# Patient Record
Sex: MALE | Race: WHITE | NOT HISPANIC OR LATINO | URBAN - METROPOLITAN AREA
[De-identification: names, ages, dates, MRNs, and addresses within clinical notes are randomized per-mention and may not be internally consistent; named-entity substitution may affect disease eponyms.]

---

## 2017-09-02 ENCOUNTER — EMERGENCY (EMERGENCY)
Facility: HOSPITAL | Age: 62
LOS: 1 days | Discharge: HOME | End: 2017-09-02
Attending: EMERGENCY MEDICINE | Admitting: EMERGENCY MEDICINE
Payer: MEDICARE

## 2017-09-02 VITALS
WEIGHT: 233.91 LBS | HEART RATE: 77 BPM | SYSTOLIC BLOOD PRESSURE: 168 MMHG | TEMPERATURE: 98 F | OXYGEN SATURATION: 98 % | DIASTOLIC BLOOD PRESSURE: 84 MMHG | RESPIRATION RATE: 20 BRPM

## 2017-09-02 DIAGNOSIS — Z98.89 OTHER SPECIFIED POSTPROCEDURAL STATES: Chronic | ICD-10-CM

## 2017-09-02 PROCEDURE — 41800 DRAINAGE OF GUM LESION: CPT

## 2017-09-02 PROCEDURE — 99284 EMERGENCY DEPT VISIT MOD MDM: CPT | Mod: 25

## 2017-09-02 PROCEDURE — 99284 EMERGENCY DEPT VISIT MOD MDM: CPT

## 2017-09-02 RX ORDER — AMOXICILLIN 250 MG/5ML
500 SUSPENSION, RECONSTITUTED, ORAL (ML) ORAL ONCE
Qty: 0 | Refills: 0 | Status: COMPLETED | OUTPATIENT
Start: 2017-09-02 | End: 2017-09-02

## 2017-09-02 RX ORDER — IBUPROFEN 200 MG
600 TABLET ORAL ONCE
Qty: 0 | Refills: 0 | Status: COMPLETED | OUTPATIENT
Start: 2017-09-02 | End: 2017-09-02

## 2017-09-02 RX ORDER — OXYCODONE HYDROCHLORIDE 5 MG/1
1 TABLET ORAL
Qty: 8 | Refills: 0
Start: 2017-09-02 | End: 2017-09-04

## 2017-09-02 RX ORDER — OXYCODONE HYDROCHLORIDE 5 MG/1
5 TABLET ORAL ONCE
Qty: 0 | Refills: 0 | Status: DISCONTINUED | OUTPATIENT
Start: 2017-09-02 | End: 2017-09-02

## 2017-09-02 RX ORDER — AMOXICILLIN 250 MG/5ML
1 SUSPENSION, RECONSTITUTED, ORAL (ML) ORAL
Qty: 21 | Refills: 0
Start: 2017-09-02 | End: 2017-09-09

## 2017-09-02 RX ADMIN — Medication 500 MILLIGRAM(S): at 19:14

## 2017-09-02 RX ADMIN — Medication 600 MILLIGRAM(S): at 17:43

## 2017-09-02 RX ADMIN — OXYCODONE HYDROCHLORIDE 5 MILLIGRAM(S): 5 TABLET ORAL at 17:43

## 2017-09-02 NOTE — ED PROVIDER NOTE - OBJECTIVE STATEMENT
Patient presenting c/o pain in mouth with swelling.  Began earlier today.  Smoker, known poor dentition, "I think a tooth is cracked".  No fevers or chills, able to swallow, no problems breathing.
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
## 2017-09-02 NOTE — ED ADULT NURSE NOTE - OBJECTIVE STATEMENT
Pt AXOX4 gross neuro intact.  PT calm, cooperative.  PT c/o dental pain and left sided mouth swelling.  Pt states  pain is severe.  Awaiting dental consult.  pt medicated for pain.  NO F/C.

## 2017-09-02 NOTE — ED PROVIDER NOTE - ATTENDING CONTRIBUTION TO CARE
Patient seen and evaluated with resident/NP/PA, however HPI, ROS, PE and MDM as documented authored by myself - Dylan Collins MD

## 2017-10-13 ENCOUNTER — EMERGENCY (EMERGENCY)
Facility: HOSPITAL | Age: 62
LOS: 1 days | Discharge: HOME | End: 2017-10-13
Attending: EMERGENCY MEDICINE | Admitting: EMERGENCY MEDICINE
Payer: MEDICARE

## 2017-10-13 VITALS
TEMPERATURE: 99 F | DIASTOLIC BLOOD PRESSURE: 70 MMHG | SYSTOLIC BLOOD PRESSURE: 144 MMHG | OXYGEN SATURATION: 99 % | RESPIRATION RATE: 16 BRPM | HEART RATE: 78 BPM

## 2017-10-13 DIAGNOSIS — Z98.89 OTHER SPECIFIED POSTPROCEDURAL STATES: Chronic | ICD-10-CM

## 2017-10-13 LAB
BASE EXCESS BLDV CALC-SCNC: 5.1 MMOL/L — HIGH (ref -2–2)
BASOPHILS # BLD AUTO: 0.1 K/UL — SIGNIFICANT CHANGE UP (ref 0–0.2)
BASOPHILS NFR BLD AUTO: 0.9 % — SIGNIFICANT CHANGE UP (ref 0–2)
CA-I SERPL-SCNC: 1.16 MMOL/L — SIGNIFICANT CHANGE UP (ref 1.12–1.3)
CHLORIDE BLDV-SCNC: 104 MMOL/L — SIGNIFICANT CHANGE UP (ref 96–108)
CO2 BLDV-SCNC: 32 MMOL/L — HIGH (ref 22–30)
EOSINOPHIL # BLD AUTO: 0.4 K/UL — SIGNIFICANT CHANGE UP (ref 0–0.5)
EOSINOPHIL NFR BLD AUTO: 3.8 % — SIGNIFICANT CHANGE UP (ref 0–6)
GAS PNL BLDV: 139 MMOL/L — SIGNIFICANT CHANGE UP (ref 136–145)
GAS PNL BLDV: SIGNIFICANT CHANGE UP
GAS PNL BLDV: SIGNIFICANT CHANGE UP
GLUCOSE BLDV-MCNC: 99 MG/DL — SIGNIFICANT CHANGE UP (ref 70–99)
HCO3 BLDV-SCNC: 31 MMOL/L — HIGH (ref 21–29)
HCT VFR BLD CALC: 32.7 % — LOW (ref 39–50)
HCT VFR BLDA CALC: 44 % — SIGNIFICANT CHANGE UP (ref 39–50)
HGB BLD CALC-MCNC: 14.4 G/DL — SIGNIFICANT CHANGE UP (ref 13–17)
HGB BLD-MCNC: 11.6 G/DL — LOW (ref 13–17)
LACTATE BLDV-MCNC: 1.4 MMOL/L — SIGNIFICANT CHANGE UP (ref 0.7–2)
LYMPHOCYTES # BLD AUTO: 3.2 K/UL — SIGNIFICANT CHANGE UP (ref 1–3.3)
LYMPHOCYTES # BLD AUTO: 33.5 % — SIGNIFICANT CHANGE UP (ref 13–44)
MCHC RBC-ENTMCNC: 34 PG — SIGNIFICANT CHANGE UP (ref 27–34)
MCHC RBC-ENTMCNC: 35.5 GM/DL — SIGNIFICANT CHANGE UP (ref 32–36)
MCV RBC AUTO: 95.8 FL — SIGNIFICANT CHANGE UP (ref 80–100)
MONOCYTES # BLD AUTO: 1.4 K/UL — HIGH (ref 0–0.9)
MONOCYTES NFR BLD AUTO: 14 % — SIGNIFICANT CHANGE UP (ref 2–14)
NEUTROPHILS # BLD AUTO: 4.6 K/UL — SIGNIFICANT CHANGE UP (ref 1.8–7.4)
NEUTROPHILS NFR BLD AUTO: 47.9 % — SIGNIFICANT CHANGE UP (ref 43–77)
PCO2 BLDV: 52 MMHG — HIGH (ref 35–50)
PH BLDV: 7.39 — SIGNIFICANT CHANGE UP (ref 7.35–7.45)
PLATELET # BLD AUTO: 293 K/UL — SIGNIFICANT CHANGE UP (ref 150–400)
PO2 BLDV: 28 MMHG — SIGNIFICANT CHANGE UP (ref 25–45)
POTASSIUM BLDV-SCNC: 3.7 MMOL/L — SIGNIFICANT CHANGE UP (ref 3.5–5)
RBC # BLD: 3.42 M/UL — LOW (ref 4.2–5.8)
RBC # FLD: 13 % — SIGNIFICANT CHANGE UP (ref 10.3–14.5)
SAO2 % BLDV: 51 % — LOW (ref 67–88)
WBC # BLD: 9.6 K/UL — SIGNIFICANT CHANGE UP (ref 3.8–10.5)
WBC # FLD AUTO: 9.6 K/UL — SIGNIFICANT CHANGE UP (ref 3.8–10.5)

## 2017-10-13 PROCEDURE — 82803 BLOOD GASES ANY COMBINATION: CPT

## 2017-10-13 PROCEDURE — 82947 ASSAY GLUCOSE BLOOD QUANT: CPT

## 2017-10-13 PROCEDURE — 82435 ASSAY OF BLOOD CHLORIDE: CPT

## 2017-10-13 PROCEDURE — 93005 ELECTROCARDIOGRAM TRACING: CPT

## 2017-10-13 PROCEDURE — 99284 EMERGENCY DEPT VISIT MOD MDM: CPT | Mod: 25,GC

## 2017-10-13 PROCEDURE — 84132 ASSAY OF SERUM POTASSIUM: CPT

## 2017-10-13 PROCEDURE — 83605 ASSAY OF LACTIC ACID: CPT

## 2017-10-13 PROCEDURE — 82565 ASSAY OF CREATININE: CPT

## 2017-10-13 PROCEDURE — 85027 COMPLETE CBC AUTOMATED: CPT

## 2017-10-13 PROCEDURE — 84295 ASSAY OF SERUM SODIUM: CPT

## 2017-10-13 PROCEDURE — 71045 X-RAY EXAM CHEST 1 VIEW: CPT

## 2017-10-13 PROCEDURE — 99283 EMERGENCY DEPT VISIT LOW MDM: CPT | Mod: 25

## 2017-10-13 PROCEDURE — 93010 ELECTROCARDIOGRAM REPORT: CPT

## 2017-10-13 PROCEDURE — 85014 HEMATOCRIT: CPT

## 2017-10-13 PROCEDURE — 82330 ASSAY OF CALCIUM: CPT

## 2017-10-13 PROCEDURE — 71010: CPT | Mod: 26

## 2017-10-13 PROCEDURE — 80053 COMPREHEN METABOLIC PANEL: CPT

## 2017-10-13 PROCEDURE — 83880 ASSAY OF NATRIURETIC PEPTIDE: CPT

## 2017-10-13 NOTE — ED ADULT NURSE NOTE - OBJECTIVE STATEMENT
60 y/o M pt with PMHx of COPD, parkinson's, PSHx of shoulder replacement received ambulatory AXOX3 c/o bilateral ankles swelling. Pt stated that  he was on his feet a lot the past two days.  patient denies CP, SOB, fever, chills, nausea, vomiting, diarrhea. denies any recent travels. respiration  even unlabored, lung field clear bilaterally. plan of care explained to patient. will monitor for safety support and safety provided.

## 2017-10-13 NOTE — ED PROVIDER NOTE - PROGRESS NOTE DETAILS
Discussed lab work was normal with pt. Notes edema is just edema in bilateral legs. Advised pt to decrease smoking and elevate legs. Will Rx pt lasix x3 days. F/u with PMD.

## 2017-10-13 NOTE — ED PROVIDER NOTE - OBJECTIVE STATEMENT
62 y/o M pt with PMHx of COPD, parkinson's, PSHx of shoulder replacement c/o swelling of bilateral ankles. Pt states he was on his feet a lot the past two days. Wife brought pt to the ED today because his ankles were very pale and cold today after the shower. Denies CP, SOB, fever, hx of similar sx or any other complaints.
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
## 2017-10-13 NOTE — ED PROVIDER NOTE - MEDICAL DECISION MAKING DETAILS
62 y/o M pt presents to the ED for peripheral edema. Plan: r/o usual causes. Pt shows no signs of DVT.

## 2017-10-14 VITALS
HEART RATE: 75 BPM | TEMPERATURE: 99 F | OXYGEN SATURATION: 100 % | RESPIRATION RATE: 17 BRPM | SYSTOLIC BLOOD PRESSURE: 135 MMHG | DIASTOLIC BLOOD PRESSURE: 68 MMHG

## 2017-10-14 LAB
ALBUMIN SERPL ELPH-MCNC: 4 G/DL — SIGNIFICANT CHANGE UP (ref 3.3–5)
ALP SERPL-CCNC: 60 U/L — SIGNIFICANT CHANGE UP (ref 40–120)
ALT FLD-CCNC: 13 U/L RC — SIGNIFICANT CHANGE UP (ref 10–45)
ANION GAP SERPL CALC-SCNC: 13 MMOL/L — SIGNIFICANT CHANGE UP (ref 5–17)
AST SERPL-CCNC: 17 U/L — SIGNIFICANT CHANGE UP (ref 10–40)
BILIRUB SERPL-MCNC: 0.2 MG/DL — SIGNIFICANT CHANGE UP (ref 0.2–1.2)
BUN SERPL-MCNC: 15 MG/DL — SIGNIFICANT CHANGE UP (ref 7–23)
CALCIUM SERPL-MCNC: 8.9 MG/DL — SIGNIFICANT CHANGE UP (ref 8.4–10.5)
CHLORIDE SERPL-SCNC: 100 MMOL/L — SIGNIFICANT CHANGE UP (ref 96–108)
CO2 SERPL-SCNC: 29 MMOL/L — SIGNIFICANT CHANGE UP (ref 22–31)
CREAT SERPL-MCNC: 0.76 MG/DL — SIGNIFICANT CHANGE UP (ref 0.5–1.3)
GLUCOSE SERPL-MCNC: 103 MG/DL — HIGH (ref 70–99)
NT-PROBNP SERPL-SCNC: 123 PG/ML — SIGNIFICANT CHANGE UP (ref 0–300)
POTASSIUM SERPL-MCNC: 4.1 MMOL/L — SIGNIFICANT CHANGE UP (ref 3.5–5.3)
POTASSIUM SERPL-SCNC: 4.1 MMOL/L — SIGNIFICANT CHANGE UP (ref 3.5–5.3)
PROT SERPL-MCNC: 7.1 G/DL — SIGNIFICANT CHANGE UP (ref 6–8.3)
SODIUM SERPL-SCNC: 142 MMOL/L — SIGNIFICANT CHANGE UP (ref 135–145)

## 2017-10-14 RX ORDER — FUROSEMIDE 40 MG
1 TABLET ORAL
Qty: 5 | Refills: 0
Start: 2017-10-14 | End: 2017-10-19

## 2019-07-16 ENCOUNTER — EMERGENCY (EMERGENCY)
Facility: HOSPITAL | Age: 64
LOS: 1 days | Discharge: HOME | End: 2019-07-16
Attending: EMERGENCY MEDICINE
Payer: MEDICARE

## 2019-07-16 VITALS
TEMPERATURE: 98 F | WEIGHT: 210.1 LBS | HEIGHT: 72 IN | RESPIRATION RATE: 16 BRPM | OXYGEN SATURATION: 95 % | SYSTOLIC BLOOD PRESSURE: 133 MMHG | DIASTOLIC BLOOD PRESSURE: 81 MMHG | HEART RATE: 82 BPM

## 2019-07-16 DIAGNOSIS — Z98.89 OTHER SPECIFIED POSTPROCEDURAL STATES: Chronic | ICD-10-CM

## 2019-07-16 PROBLEM — G20 PARKINSON'S DISEASE: Chronic | Status: ACTIVE | Noted: 2017-09-02

## 2019-07-16 PROBLEM — J44.9 CHRONIC OBSTRUCTIVE PULMONARY DISEASE, UNSPECIFIED: Chronic | Status: ACTIVE | Noted: 2017-10-14

## 2019-07-16 PROCEDURE — 90471 IMMUNIZATION ADMIN: CPT

## 2019-07-16 PROCEDURE — 99284 EMERGENCY DEPT VISIT MOD MDM: CPT | Mod: 25

## 2019-07-16 PROCEDURE — 96372 THER/PROPH/DIAG INJ SC/IM: CPT

## 2019-07-16 PROCEDURE — 71250 CT THORAX DX C-: CPT

## 2019-07-16 PROCEDURE — 90715 TDAP VACCINE 7 YRS/> IM: CPT

## 2019-07-16 PROCEDURE — 99284 EMERGENCY DEPT VISIT MOD MDM: CPT | Mod: GC

## 2019-07-16 PROCEDURE — 71250 CT THORAX DX C-: CPT | Mod: 26

## 2019-07-16 RX ORDER — KETOROLAC TROMETHAMINE 30 MG/ML
30 SYRINGE (ML) INJECTION ONCE
Refills: 0 | Status: DISCONTINUED | OUTPATIENT
Start: 2019-07-16 | End: 2019-07-16

## 2019-07-16 RX ORDER — TETANUS TOXOID, REDUCED DIPHTHERIA TOXOID AND ACELLULAR PERTUSSIS VACCINE, ADSORBED 5; 2.5; 8; 8; 2.5 [IU]/.5ML; [IU]/.5ML; UG/.5ML; UG/.5ML; UG/.5ML
0.5 SUSPENSION INTRAMUSCULAR ONCE
Refills: 0 | Status: COMPLETED | OUTPATIENT
Start: 2019-07-16 | End: 2019-07-16

## 2019-07-16 RX ORDER — ACETAMINOPHEN 500 MG
650 TABLET ORAL ONCE
Refills: 0 | Status: COMPLETED | OUTPATIENT
Start: 2019-07-16 | End: 2019-07-16

## 2019-07-16 RX ADMIN — TETANUS TOXOID, REDUCED DIPHTHERIA TOXOID AND ACELLULAR PERTUSSIS VACCINE, ADSORBED 0.5 MILLILITER(S): 5; 2.5; 8; 8; 2.5 SUSPENSION INTRAMUSCULAR at 11:41

## 2019-07-16 RX ADMIN — Medication 30 MILLIGRAM(S): at 11:41

## 2019-07-16 RX ADMIN — Medication 650 MILLIGRAM(S): at 12:10

## 2019-07-16 RX ADMIN — Medication 30 MILLIGRAM(S): at 12:10

## 2019-07-16 RX ADMIN — Medication 650 MILLIGRAM(S): at 11:40

## 2019-07-16 NOTE — ED PROVIDER NOTE - NS ED ROS FT
Constitutional: no fevers, chills  HEENT: no visual changes, no sore throat, no rhinorrhea  CV: no cp  Resp: no sob  GI: no abd pain, n/v, diarrhea/constipation  : no dysuria, hematuria  MSK: +R rib pain  skin: no rashes  neuro: no HA, no confusion  psych: no SI/HI  heme: no LAD
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
## 2019-07-16 NOTE — ED PROVIDER NOTE - PHYSICAL EXAMINATION
Vitals: WNL  Gen: laying comfortably in NAD  Head: NCAT  ENT: sclerae white, anicterus, moist mucous membranes. No exudates.   CV: RRR. Audible S1 and S2. No murmurs, rubs, gallops, S3, nor S4, 2+ radial and DP pulses   Pulm: Clear to auscultation bilaterally. No wheezes, rales, or rhonchi  Abd: soft, normoactive BS x4, NTND, no rebound, no guarding, no rashes  Musculoskeletal:  lateral aspect of rib 7-10 ttp without crepitus or obvious deformity or ecchymosis  Skin: no lesions or scars noted  Neurologic: AAOx3  : no CVA tenderness  Psych: no SI/HI

## 2020-08-03 NOTE — ED PROVIDER NOTE - PROGRESS NOTE DETAILS
72 no rib fx no ptx on ct -- scerotic focus on unknown etiology on rib 8 - pt instructed to fu outpt with pcp - copy of ct results pain well controlled in ed

## 2021-05-02 ENCOUNTER — INPATIENT (INPATIENT)
Facility: HOSPITAL | Age: 66
LOS: 2 days | Discharge: HOME | DRG: 175 | End: 2021-05-05
Attending: INTERNAL MEDICINE | Admitting: INTERNAL MEDICINE
Payer: MEDICARE

## 2021-05-02 VITALS
RESPIRATION RATE: 22 BRPM | OXYGEN SATURATION: 91 % | HEIGHT: 72 IN | WEIGHT: 233.03 LBS | SYSTOLIC BLOOD PRESSURE: 122 MMHG | TEMPERATURE: 98 F | HEART RATE: 78 BPM | DIASTOLIC BLOOD PRESSURE: 65 MMHG

## 2021-05-02 DIAGNOSIS — F10.10 ALCOHOL ABUSE, UNCOMPLICATED: ICD-10-CM

## 2021-05-02 DIAGNOSIS — Z98.89 OTHER SPECIFIED POSTPROCEDURAL STATES: Chronic | ICD-10-CM

## 2021-05-02 DIAGNOSIS — G20 PARKINSON'S DISEASE: ICD-10-CM

## 2021-05-02 DIAGNOSIS — F11.20 OPIOID DEPENDENCE, UNCOMPLICATED: ICD-10-CM

## 2021-05-02 DIAGNOSIS — F14.10 COCAINE ABUSE, UNCOMPLICATED: ICD-10-CM

## 2021-05-02 DIAGNOSIS — R06.02 SHORTNESS OF BREATH: ICD-10-CM

## 2021-05-02 LAB
ALBUMIN SERPL ELPH-MCNC: 3.7 G/DL — SIGNIFICANT CHANGE UP (ref 3.3–5)
ALP SERPL-CCNC: 68 U/L — SIGNIFICANT CHANGE UP (ref 40–120)
ALT FLD-CCNC: 11 U/L — SIGNIFICANT CHANGE UP (ref 10–45)
ANION GAP SERPL CALC-SCNC: 14 MMOL/L — SIGNIFICANT CHANGE UP (ref 5–17)
APTT BLD: 22 SEC — LOW (ref 27.5–35.5)
AST SERPL-CCNC: 11 U/L — SIGNIFICANT CHANGE UP (ref 10–40)
BASOPHILS # BLD AUTO: 0.04 K/UL — SIGNIFICANT CHANGE UP (ref 0–0.2)
BASOPHILS NFR BLD AUTO: 0.6 % — SIGNIFICANT CHANGE UP (ref 0–2)
BILIRUB SERPL-MCNC: 0.3 MG/DL — SIGNIFICANT CHANGE UP (ref 0.2–1.2)
BUN SERPL-MCNC: 13 MG/DL — SIGNIFICANT CHANGE UP (ref 7–23)
CALCIUM SERPL-MCNC: 9.6 MG/DL — SIGNIFICANT CHANGE UP (ref 8.4–10.5)
CHLORIDE SERPL-SCNC: 100 MMOL/L — SIGNIFICANT CHANGE UP (ref 96–108)
CO2 SERPL-SCNC: 28 MMOL/L — SIGNIFICANT CHANGE UP (ref 22–31)
CREAT SERPL-MCNC: 0.61 MG/DL — SIGNIFICANT CHANGE UP (ref 0.5–1.3)
D DIMER BLD IA.RAPID-MCNC: 368 NG/ML DDU — HIGH
EOSINOPHIL # BLD AUTO: 0.22 K/UL — SIGNIFICANT CHANGE UP (ref 0–0.5)
EOSINOPHIL NFR BLD AUTO: 3.5 % — SIGNIFICANT CHANGE UP (ref 0–6)
GLUCOSE BLDC GLUCOMTR-MCNC: 211 MG/DL — HIGH (ref 70–99)
GLUCOSE BLDC GLUCOMTR-MCNC: 239 MG/DL — HIGH (ref 70–99)
GLUCOSE BLDC GLUCOMTR-MCNC: 247 MG/DL — HIGH (ref 70–99)
GLUCOSE SERPL-MCNC: 155 MG/DL — HIGH (ref 70–99)
HCT VFR BLD CALC: 31.4 % — LOW (ref 39–50)
HGB BLD-MCNC: 10 G/DL — LOW (ref 13–17)
IMM GRANULOCYTES NFR BLD AUTO: 0.8 % — SIGNIFICANT CHANGE UP (ref 0–1.5)
INR BLD: 1.01 RATIO — SIGNIFICANT CHANGE UP (ref 0.88–1.16)
LYMPHOCYTES # BLD AUTO: 2.4 K/UL — SIGNIFICANT CHANGE UP (ref 1–3.3)
LYMPHOCYTES # BLD AUTO: 37.9 % — SIGNIFICANT CHANGE UP (ref 13–44)
MCHC RBC-ENTMCNC: 30 PG — SIGNIFICANT CHANGE UP (ref 27–34)
MCHC RBC-ENTMCNC: 31.8 GM/DL — LOW (ref 32–36)
MCV RBC AUTO: 94.3 FL — SIGNIFICANT CHANGE UP (ref 80–100)
MONOCYTES # BLD AUTO: 0.93 K/UL — HIGH (ref 0–0.9)
MONOCYTES NFR BLD AUTO: 14.7 % — HIGH (ref 2–14)
NEUTROPHILS # BLD AUTO: 2.7 K/UL — SIGNIFICANT CHANGE UP (ref 1.8–7.4)
NEUTROPHILS NFR BLD AUTO: 42.5 % — LOW (ref 43–77)
NRBC # BLD: 0 /100 WBCS — SIGNIFICANT CHANGE UP (ref 0–0)
NT-PROBNP SERPL-SCNC: 1214 PG/ML — HIGH (ref 0–300)
PLATELET # BLD AUTO: 373 K/UL — SIGNIFICANT CHANGE UP (ref 150–400)
POTASSIUM SERPL-MCNC: 3.8 MMOL/L — SIGNIFICANT CHANGE UP (ref 3.5–5.3)
POTASSIUM SERPL-SCNC: 3.8 MMOL/L — SIGNIFICANT CHANGE UP (ref 3.5–5.3)
PROT SERPL-MCNC: 7.1 G/DL — SIGNIFICANT CHANGE UP (ref 6–8.3)
PROTHROM AB SERPL-ACNC: 12.1 SEC — SIGNIFICANT CHANGE UP (ref 10.6–13.6)
RBC # BLD: 3.33 M/UL — LOW (ref 4.2–5.8)
RBC # FLD: 15.5 % — HIGH (ref 10.3–14.5)
SARS-COV-2 RNA SPEC QL NAA+PROBE: DETECTED
SODIUM SERPL-SCNC: 142 MMOL/L — SIGNIFICANT CHANGE UP (ref 135–145)
TROPONIN T, HIGH SENSITIVITY RESULT: 35 NG/L — SIGNIFICANT CHANGE UP (ref 0–51)
TROPONIN T, HIGH SENSITIVITY RESULT: 42 NG/L — SIGNIFICANT CHANGE UP (ref 0–51)
TSH SERPL-MCNC: 7.89 UIU/ML — HIGH (ref 0.27–4.2)
WBC # BLD: 6.34 K/UL — SIGNIFICANT CHANGE UP (ref 3.8–10.5)
WBC # FLD AUTO: 6.34 K/UL — SIGNIFICANT CHANGE UP (ref 3.8–10.5)

## 2021-05-02 PROCEDURE — 99223 1ST HOSP IP/OBS HIGH 75: CPT | Mod: CS

## 2021-05-02 PROCEDURE — 71045 X-RAY EXAM CHEST 1 VIEW: CPT | Mod: 26

## 2021-05-02 PROCEDURE — 71275 CT ANGIOGRAPHY CHEST: CPT | Mod: 26,MA

## 2021-05-02 PROCEDURE — 93010 ELECTROCARDIOGRAM REPORT: CPT

## 2021-05-02 PROCEDURE — 99291 CRITICAL CARE FIRST HOUR: CPT | Mod: CS,GC

## 2021-05-02 PROCEDURE — 93970 EXTREMITY STUDY: CPT | Mod: 26

## 2021-05-02 PROCEDURE — 99222 1ST HOSP IP/OBS MODERATE 55: CPT

## 2021-05-02 RX ORDER — DIVALPROEX SODIUM 500 MG/1
250 TABLET, DELAYED RELEASE ORAL THREE TIMES A DAY
Refills: 0 | Status: DISCONTINUED | OUTPATIENT
Start: 2021-05-02 | End: 2021-05-05

## 2021-05-02 RX ORDER — LEVOTHYROXINE SODIUM 125 MCG
100 TABLET ORAL DAILY
Refills: 0 | Status: DISCONTINUED | OUTPATIENT
Start: 2021-05-02 | End: 2021-05-05

## 2021-05-02 RX ORDER — SIMVASTATIN 20 MG/1
40 TABLET, FILM COATED ORAL AT BEDTIME
Refills: 0 | Status: DISCONTINUED | OUTPATIENT
Start: 2021-05-02 | End: 2021-05-05

## 2021-05-02 RX ORDER — OLANZAPINE 15 MG/1
5 TABLET, FILM COATED ORAL
Refills: 0 | Status: DISCONTINUED | OUTPATIENT
Start: 2021-05-02 | End: 2021-05-05

## 2021-05-02 RX ORDER — INSULIN LISPRO 100/ML
VIAL (ML) SUBCUTANEOUS AT BEDTIME
Refills: 0 | Status: DISCONTINUED | OUTPATIENT
Start: 2021-05-02 | End: 2021-05-05

## 2021-05-02 RX ORDER — DEXTROSE 50 % IN WATER 50 %
25 SYRINGE (ML) INTRAVENOUS ONCE
Refills: 0 | Status: DISCONTINUED | OUTPATIENT
Start: 2021-05-02 | End: 2021-05-05

## 2021-05-02 RX ORDER — GABAPENTIN 400 MG/1
0 CAPSULE ORAL
Qty: 0 | Refills: 0 | DISCHARGE

## 2021-05-02 RX ORDER — LEVOTHYROXINE SODIUM 125 MCG
1 TABLET ORAL
Qty: 0 | Refills: 0 | DISCHARGE

## 2021-05-02 RX ORDER — GLUCAGON INJECTION, SOLUTION 0.5 MG/.1ML
1 INJECTION, SOLUTION SUBCUTANEOUS ONCE
Refills: 0 | Status: DISCONTINUED | OUTPATIENT
Start: 2021-05-02 | End: 2021-05-05

## 2021-05-02 RX ORDER — ENOXAPARIN SODIUM 100 MG/ML
100 INJECTION SUBCUTANEOUS
Refills: 0 | Status: DISCONTINUED | OUTPATIENT
Start: 2021-05-02 | End: 2021-05-05

## 2021-05-02 RX ORDER — MIRTAZAPINE 45 MG/1
7.5 TABLET, ORALLY DISINTEGRATING ORAL
Qty: 0 | Refills: 0 | DISCHARGE

## 2021-05-02 RX ORDER — OLANZAPINE 15 MG/1
1 TABLET, FILM COATED ORAL
Qty: 0 | Refills: 0 | DISCHARGE

## 2021-05-02 RX ORDER — ENOXAPARIN SODIUM 100 MG/ML
106 INJECTION SUBCUTANEOUS ONCE
Refills: 0 | Status: DISCONTINUED | OUTPATIENT
Start: 2021-05-02 | End: 2021-05-02

## 2021-05-02 RX ORDER — DEXTROSE 50 % IN WATER 50 %
12.5 SYRINGE (ML) INTRAVENOUS ONCE
Refills: 0 | Status: DISCONTINUED | OUTPATIENT
Start: 2021-05-02 | End: 2021-05-05

## 2021-05-02 RX ORDER — MIRTAZAPINE 45 MG/1
7.5 TABLET, ORALLY DISINTEGRATING ORAL AT BEDTIME
Refills: 0 | Status: DISCONTINUED | OUTPATIENT
Start: 2021-05-02 | End: 2021-05-05

## 2021-05-02 RX ORDER — SERTRALINE 25 MG/1
0 TABLET, FILM COATED ORAL
Qty: 0 | Refills: 0 | DISCHARGE

## 2021-05-02 RX ORDER — DEXTROSE 50 % IN WATER 50 %
15 SYRINGE (ML) INTRAVENOUS ONCE
Refills: 0 | Status: DISCONTINUED | OUTPATIENT
Start: 2021-05-02 | End: 2021-05-05

## 2021-05-02 RX ORDER — LANOLIN ALCOHOL/MO/W.PET/CERES
10 CREAM (GRAM) TOPICAL AT BEDTIME
Refills: 0 | Status: DISCONTINUED | OUTPATIENT
Start: 2021-05-02 | End: 2021-05-05

## 2021-05-02 RX ORDER — SODIUM CHLORIDE 9 MG/ML
1000 INJECTION, SOLUTION INTRAVENOUS
Refills: 0 | Status: DISCONTINUED | OUTPATIENT
Start: 2021-05-02 | End: 2021-05-05

## 2021-05-02 RX ORDER — THIAMINE MONONITRATE (VIT B1) 100 MG
1 TABLET ORAL
Qty: 0 | Refills: 0 | DISCHARGE

## 2021-05-02 RX ORDER — DIVALPROEX SODIUM 500 MG/1
1 TABLET, DELAYED RELEASE ORAL
Qty: 0 | Refills: 0 | DISCHARGE

## 2021-05-02 RX ORDER — LANOLIN ALCOHOL/MO/W.PET/CERES
1 CREAM (GRAM) TOPICAL
Qty: 0 | Refills: 0 | DISCHARGE

## 2021-05-02 RX ORDER — VENLAFAXINE HCL 75 MG
0 CAPSULE, EXT RELEASE 24 HR ORAL
Qty: 0 | Refills: 0 | DISCHARGE

## 2021-05-02 RX ORDER — SIMVASTATIN 20 MG/1
1 TABLET, FILM COATED ORAL
Qty: 0 | Refills: 0 | DISCHARGE

## 2021-05-02 RX ORDER — DIVALPROEX SODIUM 500 MG/1
0 TABLET, DELAYED RELEASE ORAL
Qty: 0 | Refills: 0 | DISCHARGE

## 2021-05-02 RX ORDER — SERTRALINE 25 MG/1
100 TABLET, FILM COATED ORAL DAILY
Refills: 0 | Status: DISCONTINUED | OUTPATIENT
Start: 2021-05-02 | End: 2021-05-05

## 2021-05-02 RX ORDER — INSULIN LISPRO 100/ML
VIAL (ML) SUBCUTANEOUS
Refills: 0 | Status: DISCONTINUED | OUTPATIENT
Start: 2021-05-02 | End: 2021-05-05

## 2021-05-02 RX ORDER — SERTRALINE 25 MG/1
1 TABLET, FILM COATED ORAL
Qty: 0 | Refills: 0 | DISCHARGE

## 2021-05-02 RX ORDER — MAGNESIUM SULFATE 500 MG/ML
2 VIAL (ML) INJECTION ONCE
Refills: 0 | Status: COMPLETED | OUTPATIENT
Start: 2021-05-02 | End: 2021-05-02

## 2021-05-02 RX ORDER — GABAPENTIN 400 MG/1
400 CAPSULE ORAL
Refills: 0 | Status: DISCONTINUED | OUTPATIENT
Start: 2021-05-02 | End: 2021-05-05

## 2021-05-02 RX ORDER — DEXAMETHASONE 0.5 MG/5ML
6 ELIXIR ORAL ONCE
Refills: 0 | Status: COMPLETED | OUTPATIENT
Start: 2021-05-02 | End: 2021-05-02

## 2021-05-02 RX ADMIN — ENOXAPARIN SODIUM 100 MILLIGRAM(S): 100 INJECTION SUBCUTANEOUS at 17:53

## 2021-05-02 RX ADMIN — Medication 2: at 13:20

## 2021-05-02 RX ADMIN — Medication 0: at 21:28

## 2021-05-02 RX ADMIN — MIRTAZAPINE 7.5 MILLIGRAM(S): 45 TABLET, ORALLY DISINTEGRATING ORAL at 21:26

## 2021-05-02 RX ADMIN — ENOXAPARIN SODIUM 100 MILLIGRAM(S): 100 INJECTION SUBCUTANEOUS at 09:15

## 2021-05-02 RX ADMIN — Medication 10 MILLIGRAM(S): at 21:26

## 2021-05-02 RX ADMIN — Medication 2: at 17:51

## 2021-05-02 RX ADMIN — OLANZAPINE 5 MILLIGRAM(S): 15 TABLET, FILM COATED ORAL at 17:53

## 2021-05-02 RX ADMIN — Medication 6 MILLIGRAM(S): at 06:49

## 2021-05-02 RX ADMIN — DIVALPROEX SODIUM 250 MILLIGRAM(S): 500 TABLET, DELAYED RELEASE ORAL at 21:26

## 2021-05-02 RX ADMIN — GABAPENTIN 400 MILLIGRAM(S): 400 CAPSULE ORAL at 17:52

## 2021-05-02 RX ADMIN — Medication 50 GRAM(S): at 06:19

## 2021-05-02 RX ADMIN — SIMVASTATIN 40 MILLIGRAM(S): 20 TABLET, FILM COATED ORAL at 21:26

## 2021-05-02 NOTE — BH CONSULTATION LIAISON ASSESSMENT NOTE - NSICDXBHSECONDARYDX_PSY_ALL_CORE
Cocaine abuse   F14.10  Opioid dependence   F11.20  AA (alcohol abuse)   F10.10  Parkinson's disease   G20

## 2021-05-02 NOTE — ED ADULT NURSE REASSESSMENT NOTE - NS ED NURSE REASSESS COMMENT FT1
Report received from MARIANELA Boyce.  Patient awake and alert reports breathing feels better than when he arrived with nasal cannula in place on 3L.  IV in right wrist #20 and left AC #20 is patent, flushing without issue and is free of redness, swelling or pain Patient aware CT pending and agreeable to plan.  Patient has call bell next to him and verbalized how to use call bell.  Bed in lowest position.

## 2021-05-02 NOTE — BH CONSULTATION LIAISON ASSESSMENT NOTE - CASE SUMMARY
Patient is a 66yo male, , domiciled with family, retired [place] ,  , PPH long history of cocaine and opioid use, multiple rehab admissions, no prior suicide attempt, no prior psych admission, with PMHx parkinson's, copd, dm, covid in past unsure of exact dates, also recently intubated and extubated early April (pt says not from covid) sent to rehab where he ama'd yest because he had a  to go to [facility] for shortness of breath. Psychiatry was consulted for medication management.     Currently, patient is superficially cooperative, minimizes the severity of his substance use, AOtyree however is a limited historian. He admits to cocaine use. Denies any intentional overdose, or prior suicide attempts. Denies any depression, anxiety, joe, or psychosis. Denies passive and active SIIP/HIIP. Confirms taking the following psychotropic medications: Zyprexa 5mg bid, Depakote 250mg tid, Neurontin 400mg bid, Remeron 7.5mg qhs, melatonin 10mg qhs, sertraline 100mg qd, Synthroid 100mcg. Pt denies any thoughts of wishing to harm self or others at this time and does not recall overdosing on medications prior to his last hospitalization.  Pt does not meet criteria for inpatient psychiatric hospitalization at this time. He may continue with his outpt psychiatric care after discharge.

## 2021-05-02 NOTE — BH CONSULTATION LIAISON ASSESSMENT NOTE - SUMMARY
Patient is a 64yo male, , domiciled with family, retired [place] ,  , PPH long history of cocaine and opioid use, multiple rehab admissions, no prior suicide attempt, no prior psych admission, with PMHx parkinson's, copd, dm, covid in past unsure of exact dates, also recently intubated and extubated early April (pt says not from covid) sent to rehab where he ama'd yest because he had a  to go to [facility] for shortness of breath. Psychiatry was consulted for medication management.     Currently, patient is superficially cooperative, minimizes the severity of his substance use, AOx3 however is a limited historian. He admits to cocaine use. Denies any intentional overdose, or prior suicide attempts. Denies any depression, anxiety, joe, or psychosis. Denies passive and active SIIP/HIIP. Confirms taking the following psychotropic medications: Zyprexa 5mg bid, Depakote 250mg tid, Neurontin 400mg bid, Remeron 7.5mg qhs, melatonin 10mg qhs, sertraline 100mg qd, Synthroid 100mcg. MSE notable for depressed, mood-incongruent affect, nonspontaneous speech with decreased production and concrete thought process, however Pt also has comorbid Parkinson's disease which may contribute to today's presentation.     Recommendations:  - can continue home psych medications - hold Zyprexa if QTc >500ms: Zyprexa 5mg bid, Depakote 250mg tid, Neurontin 400mg bid, Remeron 7.5mg qhs, melatonin 10mg qhs, sertraline 100mg qd, Synthroid 100mcg.  - consider PRN Zyprexa 2.5mg PO/IM q6hrs for agitation if qtc <500  ***CANNOT give IM Zyprexa within 4 hours of IV/IM Ativan due to increased risk of respiratory depression and death.****  - Monitor EKG for qtc, monitor ANC.   - Pt denies SI/HI, contracts for safety appropriately, can remain on routine obs  - Pt would benefit from referral to addiction services as well as referral for outpatient psychiatrist and therapy - can offer [facility] [phone]. He does not meet criteria for inpatient psychiatric admission at this time.

## 2021-05-02 NOTE — BH CONSULTATION LIAISON ASSESSMENT NOTE - NSBHCONSULTMEDAGITATION_PSY_A_CORE FT
- consider PRN Zyprexa 2.5mg PO/IM q6hrs for agitation if qtc <500  ***CANNOT give IM Zyprexa within 4 hours of IV/IM Ativan due to increased risk of respiratory depression and death.****  - Monitor EKG for qtc, monitor ANC.

## 2021-05-02 NOTE — ED ADULT NURSE REASSESSMENT NOTE - NS ED NURSE REASSESS COMMENT FT1
Called the admission team at [phone] to ask for a diet order. Patient is able to eat a regular diet with no restrictions as per admitting team.

## 2021-05-02 NOTE — ED PROVIDER NOTE - CARE PLAN
Principal Discharge DX:	SOB (shortness of breath)   Principal Discharge DX:	SOB (shortness of breath)  Secondary Diagnosis:	Hypoxia   Principal Discharge DX:	PE (pulmonary thromboembolism)  Secondary Diagnosis:	Hypoxia  Secondary Diagnosis:	SOB (shortness of breath)
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
## 2021-05-02 NOTE — ED ADULT NURSE REASSESSMENT NOTE - NS ED NURSE REASSESS COMMENT FT1
Finger stick 182.  Results did not cross over into sunrise.  Shared results with MARIANELA Hassan in PICU where patient is being transferred for admission bed.

## 2021-05-02 NOTE — H&P ADULT - HISTORY OF PRESENT ILLNESS
66yo m pmhx parkinson's, copd, dm, covid in past unsure of exact dates, also recently intubated and extubated early april (pt says not from covid) sent to rehab where he ama'd yest because he had a  to go to [facility]  [facility] for [facility]ness of breath. wife at bedside reports he has been having episodes where he becomes very pale and diaphoretic. he is also having night sweats. also reports having mild increase in leg size and is visibly shortness of breath tonight. Denies recent trauma, fevers, headache, dizziness, nausea, vomiting, dysuria, freq, hematuria, diarrhea, constipation, chest pain, cough.

## 2021-05-02 NOTE — ED PROVIDER NOTE - CLINICAL SUMMARY MEDICAL DECISION MAKING FREE TEXT BOX
66yo m pmhx parkinson's, copd, dm, covid in past unsure of exact dates, also recently intubated and extubated early april (pt says not from covid) sent to rehab where he ama'd yest because he had a  to go to [facility]  [facility] for shortness of breath. concern for infectious etiology with ?diaphoresis and shortness of breath but afebrile, intermittently tachy in triage, will eval for hyperthyroid, copd also likely but normal lung exam, hypoxic, hx of covid which has since resolved, unsure if reinfection but unlikely without other complaints, will dose steroids, consider pe if no alternative dx. unlikely acs, will admit for further mgmt

## 2021-05-02 NOTE — BH CONSULTATION LIAISON ASSESSMENT NOTE - OTHER PAST PSYCHIATRIC HISTORY (INCLUDE DETAILS REGARDING ONSET, COURSE OF ILLNESS, INPATIENT/OUTPATIENT TREATMENT)
diagnosed with bipolar disorder decades ago, however Sx present with concurrent substance use. No inpatient psych admission. Multiple inpatient rehab for alcohol, cocaine and opioid use.

## 2021-05-02 NOTE — H&P ADULT - NSHPLABSRESULTS_GEN_ALL_CORE
LABS:                        10.0   6.34  )-----------( 373      ( 02 May 2021 05:12 )             31.4     05-02    142  |  100  |  13  ----------------------------<  155<H>  3.8   |  28  |  0.61    Ca    9.6      02 May 2021 05:13  Mg     1.4     05-02    TPro  7.1  /  Alb  3.7  /  TBili  0.3  /  DBili  x   /  AST  11  /  ALT  11  /  AlkPhos  68  05-02    PT/INR - ( 02 May 2021 09:00 )   PT: 12.1 sec;   INR: 1.01 ratio         PTT - ( 02 May 2021 09:00 )  PTT:22.0 sec          RADIOLOGY & ADDITIONAL TESTS:

## 2021-05-02 NOTE — ED ADULT NURSE NOTE - OBJECTIVE STATEMENT
66 yo male with a PMH of parkinson's, COPD, hyperthyroidism, pneumothorax, presents to the ED via EMS from home complaining of sudden onset of SOB. Patient's wife at bedside providing history, states that patient was seen at [facility] 3/17 for SOB. States that he ended up being intubated from 3/17-4/5 for respiratory failure. Patient's wife was unaware of detailed care at [place]. Patient was moved to rehab center after being extubated--wife had signed him out AMA due to patient's sister's death. Rehab center refused to provide wife with medications given and care provided for patient during stay. States that since Thursday, patient has been soaking through clothes and has been diaphoretic since. Tonight, intermittently was waking up to tell wife that he felt SOB--did not want to come to hospital. Patient states he feels SOB on RA, placed on NC for comfort. Labs drawn, placed on cardiac monitor, MD Gutierrez at bedside evaluating patient. Denies headache, dizziness, vision changes, chest pain, abdominal pain, nausea, vomiting, diarrhea, fevers, chills, dysuria, hematuria, recent illness travel or fall.

## 2021-05-02 NOTE — ED ADULT NURSE REASSESSMENT NOTE - NS ED NURSE REASSESS COMMENT FT1
Patient ate sandwich and is resting in stretcher aware bed assignment still pending.  Patient repositioned for comfort. Nasal cannula in place on 3 L, bed in lowest position and call bell next to patient.

## 2021-05-02 NOTE — ED PROVIDER NOTE - OBJECTIVE STATEMENT
64yo m pmhx parkinson's, copd, dm, covid in past unsure of exact dates, also recently intubated and extubated early april (pt says not from covid) sent to rehab where he ama'd yest because he had a  to go to is  ems for shortness of breath. wife at bedside reports he has been having episodes where he becomes very pale and diaphoretic. also reports having mild increase in leg size and is visibly shortness of breath tonight. Denies recent trauma, fevers, headache, dizziness, nausea, vomiting, dysuria, freq, hematuria, diarrhea, constipation, chest pain, cough.    pmd at [facility]

## 2021-05-02 NOTE — ED ADULT NURSE REASSESSMENT NOTE - NS ED NURSE REASSESS COMMENT FT1
Dr. Romo at bedside speaking with patient regarding CT findings.  Patient aware and bed in lowest position and call bell in reach

## 2021-05-02 NOTE — ED ADULT NURSE REASSESSMENT NOTE - NS ED NURSE REASSESS COMMENT FT1
Patient resting in stretcher denies pain or discomfort and is aware he is admitted and pending a bed assignment.  Patient made aware of indication for Lovenox and to monitor for s/s of bleeding.  Patient verbalized understanding.  Bed in lowest position, call bell next to patient.

## 2021-05-02 NOTE — BH CONSULTATION LIAISON ASSESSMENT NOTE - CURRENT MEDICATION
MEDICATIONS  (STANDING):  dextrose 40% Gel 15 Gram(s) Oral once  dextrose 5%. 1000 milliLiter(s) (50 mL/Hr) IV Continuous <Continuous>  dextrose 5%. 1000 milliLiter(s) (100 mL/Hr) IV Continuous <Continuous>  dextrose 50% Injectable 25 Gram(s) IV Push once  dextrose 50% Injectable 12.5 Gram(s) IV Push once  dextrose 50% Injectable 25 Gram(s) IV Push once  diVALproex  milliGRAM(s) Oral three times a day  enoxaparin Injectable 100 milliGRAM(s) SubCutaneous two times a day  gabapentin 400 milliGRAM(s) Oral two times a day  glucagon  Injectable 1 milliGRAM(s) IntraMuscular once  insulin lispro (ADMELOG) corrective regimen sliding scale   SubCutaneous three times a day before meals  insulin lispro (ADMELOG) corrective regimen sliding scale   SubCutaneous at bedtime  levothyroxine 100 MICROGram(s) Oral daily  melatonin 10 milliGRAM(s) Oral at bedtime  mirtazapine 7.5 milliGRAM(s) Oral at bedtime  OLANZapine 5 milliGRAM(s) Oral two times a day  sertraline 100 milliGRAM(s) Oral daily  simvastatin 40 milliGRAM(s) Oral at bedtime    MEDICATIONS  (PRN):

## 2021-05-02 NOTE — BH CONSULTATION LIAISON ASSESSMENT NOTE - NSBHCHARTREVIEWLAB_PSY_A_CORE FT
LABS:  cret                        10.0   6.34  )-----------( 373      ( 02 May 2021 05:12 )             31.4     05-02    142  |  100  |  13  ----------------------------<  155<H>  3.8   |  28  |  0.61    Ca    9.6      02 May 2021 05:13  Mg     1.4     05-02    TPro  7.1  /  Alb  3.7  /  TBili  0.3  /  DBili  x   /  AST  11  /  ALT  11  /  AlkPhos  68  05-02    PT/INR - ( 02 May 2021 09:00 )   PT: 12.1 sec;   INR: 1.01 ratio         PTT - ( 02 May 2021 09:00 )  PTT:22.0 sec

## 2021-05-02 NOTE — CHART NOTE - NSCHARTNOTEFT_GEN_A_CORE
Called by Radiology, notified of L soleal DVT . Discussed with Dr. Dougherty, continue Lovenox 100 BID

## 2021-05-02 NOTE — BH CONSULTATION LIAISON ASSESSMENT NOTE - NSBHCHARTREVIEWVS_PSY_A_CORE FT
Vital Signs Last 24 Hrs  T(C): 37 (02 May 2021 12:05), Max: 37 (02 May 2021 12:05)  T(F): 98.6 (02 May 2021 12:05), Max: 98.6 (02 May 2021 12:05)  HR: 71 (02 May 2021 12:05) (66 - 78)  BP: 152/92 (02 May 2021 12:05) (122/65 - 159/91)  BP(mean): 111 (02 May 2021 10:16) (111 - 111)  RR: 16 (02 May 2021 12:05) (13 - 22)  SpO2: 97% (02 May 2021 12:05) (91% - 100%)

## 2021-05-02 NOTE — ED ADULT NURSE REASSESSMENT NOTE - NS ED NURSE REASSESS COMMENT FT1
Patient resting in the stretcher, no complaints at this time. Patient used a urinal to urinate at the bedside. VSS. Safety measures maintained. Bed in the lowest position. Call bell within reach. No acute distress noted or further complaints at this time. Patient awaiting inpatient bed assignment.

## 2021-05-02 NOTE — ED PROVIDER NOTE - PHYSICAL EXAMINATION
Physical Exam:  Gen: ill appearing  Head: normal appearing  HEENT: normal conjunctiva, oral mucosa dry  Lung: mild respiratory distress, speaking in partial sentences, cta throughout  CV: rr  Abd: soft, ND, NT  MSK: no visible deformities  Neuro: No focal deficits, resting tremors  Skin: Warm, bilateral +1 edema circumferentially   Psych: calm  ~Edward Waldrop D.O. -Resident

## 2021-05-02 NOTE — ED ADULT NURSE REASSESSMENT NOTE - NS ED NURSE REASSESS COMMENT FT1
Patient back from CT is aware CT result pending and is on 3L nasal cannula and denies difficulty breathing.  Call bell given to patient, bed in lowest position and patient agreeable to plan.
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
## 2021-05-02 NOTE — BH CONSULTATION LIAISON ASSESSMENT NOTE - HPI (INCLUDE ILLNESS QUALITY, SEVERITY, DURATION, TIMING, CONTEXT, MODIFYING FACTORS, ASSOCIATED SIGNS AND SYMPTOMS)
Patient is a 66yo male, , domiciled with family, retired [place] ,  , PPH long history of cocaine and opioid use, multiple rehab admissions, no prior suicide attempt, no prior psych admission, with PMHx parkinson's, copd, dm, covid in past unsure of exact dates, also recently intubated and extubated early April (pt says not from covid) sent to rehab where he ama'd yest because he had a  to go to [facility] for shortness of breath. Psychiatry was consulted for medication management.     On interview, patient was superficially cooperative, AOx3 however could not recall recent events leading to first hospitalization. He admits to using cocaine, and denies intentional overdose. Reports "good" mood, denies AH/VH, paranoia. Denies passive and active SIIP/HIIP. States he has only been to rehab once in the  despite conflicting history from providers and Pt's wife of Pt attending multiple rehab programs. Denies prior suicide attempt. Confirms taking the following psychotropic medications: Zyprexa 5mg bid, Depakote 250mg tid, Neurontin 400mg bid, Remeron 7.5mg qhs, melatonin 10mg qhs, sertraline 100mg qd, Synthroid 100mcg.    Collateral from Pt's wife confirms that on 3/17/21, Pt was found nonresponsive and not breathing, required intubation and admission to [facility]. Pt found positive for cocaine and opiates “speedballing”, wife does not believe this was a suicide attempt. Wife has been  to Pt for 35 years, states Pt has been dealing with alcoholism and drugs since teens. Pt was mandated by [facility] dept to attend inpatient rehab many times, longest stay [facility] [place]  in . Pt doesn't like to process his emotions and does not go to AA or NA meetings. Wife denies knowledge of prior psych admissions, Pt was diagnosed with bipolar disorder by internist Dr. Alcides Kent who has treated Pt's family for decades - unclear if Sx is from concurrent drug use or bipolar dx. Wife also says Pt is self-centered, demanding. Pt was very depressed after he was diagnosed with PD, withdrawn, does not take care of ADLs (shower, won’t change clothes, overeats, stares at TV), denies he has Dx. Psych meds improved Pt’s motivation, however Pt gained weight. Pt was in [facility] . Pt’s sister passed, Pt left rehab AMA to go to .    FHx – father and mother both alcohol use. Maternal relatives with bipolar dx, possible schizophrenia; 26yo son bipolar dx with joe, also uses drugs, rehab since 17yo.     SH – Pt has 6 kids. Low frustration tolerance, goes to bars.

## 2021-05-02 NOTE — H&P ADULT - ASSESSMENT
64yo m pmhx parkinson's, copd, dm, covid in past unsure of exact dates, also recently intubated and extubated early april (pt says not from covid) sent to rehab where he ama'd yest because he had a  to go to is pw ems for shortness of breath. wife at bedside reports he has been having episodes where he becomes very pale and diaphoretic. he is also having night sweats. also reports having mild increase in leg size and is visibly shortness of breath tonight. Denies recent trauma, fevers, headache, dizziness, nausea, vomiting, dysuria, freq, hematuria, diarrhea, constipation, chest pain, cough.    subsegmental PE  - jesica lower ext doppler  - echo  - full dose lovenox  - pulm eval    hypoxic resp failure 2/2 covid 19 positive vs PE  - keep O2 saturation above 92%  - follow up inflammatory markers  - start decadron  - ? remdesivir  - pulm eval    anemia  - check iron studies    parkinson's disease  - cont home meds

## 2021-05-02 NOTE — ED PROVIDER NOTE - PROGRESS NOTE DETAILS
Attending Brenda:  repeat ekg as read by me shows a narrow complex tachycardia to 140 which looks like a flutter, pt has parkinsons so tremor may have been playing a role but will keep on telemetry and treat as flutter until proven othewise. Repeat ekg after that shows nsr 72 bpm no ischemic changes qt 488

## 2021-05-02 NOTE — ED PROVIDER NOTE - NS ED ROS FT
ROS:  GENERAL: No fever, no chills  EYES: no change in vision  HEENT: no trouble swallowing, no trouble speaking  CARDIAC: no chest pain  PULMONARY: no cough, + shortness of breath  GI: no abdominal pain, no nausea, no vomiting, no diarrhea, no constipation  : No dysuria, no frequency, no change in appearance, or odor of urine  SKIN: no rashes  NEURO: no headache, + weakness  MSK: No joint pain  ~Edward Waldrop D.O. -Resident

## 2021-05-02 NOTE — ED PROVIDER NOTE - ATTENDING CONTRIBUTION TO CARE
MD Gutierrez:  patient seen and evaluated personally.   I agree with the History & Physical,  Impression & Plan other than what was detailed in my note.  MD Gutierrez  66 y/o m hx of parkinsons, copd, dm, covid in past, recently intubated and extubated early april (pt says not from covid) sent to rehab where he ama'd yest because he had a  to go to is presenting w/ worsened sob. Denies cp but wife states that he has been soaking through sheets. Mild increase in leg swelling, afebrile mild hypoxia on RA, (will get rectal temp)  non toxic well appearing on NC 3 L, NC/AT,  conjunctiva non conjected, sclera anicteric, moist mucous membranes, neck supple, heart sounds, normal, no mrg, lungs cta b/l no wrr, abd soft non distended w/ no tenderness, plus 1 edema , axox3, , normal mood and affect. Less likely copd given good air movement no wheezing, rectal temp, cxr screen for pna, ekg and acs workup, telemetry, probnp. If no clear cause of sob on cxr, may need dimer to r/o pe.

## 2021-05-02 NOTE — BH CONSULTATION LIAISON ASSESSMENT NOTE - NSBHCHARTREVIEWINVESTIGATE_PSY_A_CORE FT
< from: CT Angio Chest w/ IV Cont (05.02.21 @ 07:44) >  IMPRESSION:  Subsegmental pulmonary embolism to the right lower lobe.  Mediastinal and hilar adenopathy new from the prior study which is nonspecific.  Healed fracture deformities of the right lateral eighth and ninth ribs. Healed rib fracture of the fifth lateral right rib. The previously noted sclerotic focus within the anterolateral right eighth rib is not visualized and likely represented healing rib fracture.  < end of copied text >

## 2021-05-03 DIAGNOSIS — R06.02 SHORTNESS OF BREATH: ICD-10-CM

## 2021-05-03 DIAGNOSIS — U07.1 COVID-19: ICD-10-CM

## 2021-05-03 DIAGNOSIS — J44.9 CHRONIC OBSTRUCTIVE PULMONARY DISEASE, UNSPECIFIED: ICD-10-CM

## 2021-05-03 DIAGNOSIS — I26.99 OTHER PULMONARY EMBOLISM WITHOUT ACUTE COR PULMONALE: ICD-10-CM

## 2021-05-03 LAB
A1C WITH ESTIMATED AVERAGE GLUCOSE RESULT: 7.7 % — HIGH (ref 4–5.6)
ANION GAP SERPL CALC-SCNC: 15 MMOL/L — SIGNIFICANT CHANGE UP (ref 5–17)
BUN SERPL-MCNC: 13 MG/DL — SIGNIFICANT CHANGE UP (ref 7–23)
CALCIUM SERPL-MCNC: 9.3 MG/DL — SIGNIFICANT CHANGE UP (ref 8.4–10.5)
CHLORIDE SERPL-SCNC: 98 MMOL/L — SIGNIFICANT CHANGE UP (ref 96–108)
CO2 SERPL-SCNC: 27 MMOL/L — SIGNIFICANT CHANGE UP (ref 22–31)
COVID-19 SPIKE DOMAIN AB INTERP: POSITIVE
COVID-19 SPIKE DOMAIN AB INTERP: POSITIVE
COVID-19 SPIKE DOMAIN ANTIBODY RESULT: >250 U/ML — HIGH
COVID-19 SPIKE DOMAIN ANTIBODY RESULT: >250 U/ML — HIGH
CREAT SERPL-MCNC: 0.56 MG/DL — SIGNIFICANT CHANGE UP (ref 0.5–1.3)
CRP SERPL-MCNC: 26 MG/L — HIGH (ref 0–4)
ESTIMATED AVERAGE GLUCOSE: 174 MG/DL — HIGH (ref 68–114)
FERRITIN SERPL-MCNC: 235 NG/ML — SIGNIFICANT CHANGE UP (ref 30–400)
FOLATE SERPL-MCNC: >20 NG/ML — SIGNIFICANT CHANGE UP
GLUCOSE BLDC GLUCOMTR-MCNC: 174 MG/DL — HIGH (ref 70–99)
GLUCOSE BLDC GLUCOMTR-MCNC: 182 MG/DL — HIGH (ref 70–99)
GLUCOSE BLDC GLUCOMTR-MCNC: 202 MG/DL — HIGH (ref 70–99)
GLUCOSE BLDC GLUCOMTR-MCNC: 220 MG/DL — HIGH (ref 70–99)
GLUCOSE BLDC GLUCOMTR-MCNC: 279 MG/DL — HIGH (ref 70–99)
GLUCOSE SERPL-MCNC: 176 MG/DL — HIGH (ref 70–99)
HCT VFR BLD CALC: 31.7 % — LOW (ref 39–50)
HCV AB S/CO SERPL IA: 0.23 S/CO — SIGNIFICANT CHANGE UP (ref 0–0.99)
HCV AB SERPL-IMP: SIGNIFICANT CHANGE UP
HGB BLD-MCNC: 10 G/DL — LOW (ref 13–17)
IRON SATN MFR SERPL: 25 % — SIGNIFICANT CHANGE UP (ref 16–55)
IRON SATN MFR SERPL: 66 UG/DL — SIGNIFICANT CHANGE UP (ref 45–165)
MAGNESIUM SERPL-MCNC: 1.7 MG/DL — SIGNIFICANT CHANGE UP (ref 1.6–2.6)
MCHC RBC-ENTMCNC: 29.6 PG — SIGNIFICANT CHANGE UP (ref 27–34)
MCHC RBC-ENTMCNC: 31.5 GM/DL — LOW (ref 32–36)
MCV RBC AUTO: 93.8 FL — SIGNIFICANT CHANGE UP (ref 80–100)
NRBC # BLD: 0 /100 WBCS — SIGNIFICANT CHANGE UP (ref 0–0)
PHOSPHATE SERPL-MCNC: 4.2 MG/DL — SIGNIFICANT CHANGE UP (ref 2.5–4.5)
PLATELET # BLD AUTO: 422 K/UL — HIGH (ref 150–400)
POTASSIUM SERPL-MCNC: 4.2 MMOL/L — SIGNIFICANT CHANGE UP (ref 3.5–5.3)
POTASSIUM SERPL-SCNC: 4.2 MMOL/L — SIGNIFICANT CHANGE UP (ref 3.5–5.3)
PROCALCITONIN SERPL-MCNC: 0.05 NG/ML — SIGNIFICANT CHANGE UP (ref 0.02–0.1)
RBC # BLD: 3.38 M/UL — LOW (ref 4.2–5.8)
RBC # FLD: 15 % — HIGH (ref 10.3–14.5)
SARS-COV-2 IGG+IGM SERPL QL IA: >250 U/ML — HIGH
SARS-COV-2 IGG+IGM SERPL QL IA: >250 U/ML — HIGH
SARS-COV-2 IGG+IGM SERPL QL IA: POSITIVE
SARS-COV-2 IGG+IGM SERPL QL IA: POSITIVE
SODIUM SERPL-SCNC: 140 MMOL/L — SIGNIFICANT CHANGE UP (ref 135–145)
T4 FREE SERPL-MCNC: 0.9 NG/DL — SIGNIFICANT CHANGE UP (ref 0.9–1.8)
TIBC SERPL-MCNC: 259 UG/DL — SIGNIFICANT CHANGE UP (ref 220–430)
TSH SERPL-MCNC: 1.64 UIU/ML — SIGNIFICANT CHANGE UP (ref 0.27–4.2)
UIBC SERPL-MCNC: 194 UG/DL — SIGNIFICANT CHANGE UP (ref 110–370)
VIT B12 SERPL-MCNC: 517 PG/ML — SIGNIFICANT CHANGE UP (ref 232–1245)
WBC # BLD: 8.27 K/UL — SIGNIFICANT CHANGE UP (ref 3.8–10.5)
WBC # FLD AUTO: 8.27 K/UL — SIGNIFICANT CHANGE UP (ref 3.8–10.5)

## 2021-05-03 PROCEDURE — 93306 TTE W/DOPPLER COMPLETE: CPT | Mod: 26

## 2021-05-03 PROCEDURE — 99233 SBSQ HOSP IP/OBS HIGH 50: CPT

## 2021-05-03 RX ORDER — TIOTROPIUM BROMIDE AND OLODATEROL 3.124; 2.736 UG/1; UG/1
2 SPRAY, METERED RESPIRATORY (INHALATION) DAILY
Refills: 0 | Status: DISCONTINUED | OUTPATIENT
Start: 2021-05-03 | End: 2021-05-05

## 2021-05-03 RX ORDER — ALBUTEROL 90 UG/1
2 AEROSOL, METERED ORAL EVERY 6 HOURS
Refills: 0 | Status: DISCONTINUED | OUTPATIENT
Start: 2021-05-03 | End: 2021-05-04

## 2021-05-03 RX ADMIN — OLANZAPINE 5 MILLIGRAM(S): 15 TABLET, FILM COATED ORAL at 18:14

## 2021-05-03 RX ADMIN — DIVALPROEX SODIUM 250 MILLIGRAM(S): 500 TABLET, DELAYED RELEASE ORAL at 13:25

## 2021-05-03 RX ADMIN — OLANZAPINE 5 MILLIGRAM(S): 15 TABLET, FILM COATED ORAL at 05:05

## 2021-05-03 RX ADMIN — Medication 2: at 12:33

## 2021-05-03 RX ADMIN — Medication 1: at 08:25

## 2021-05-03 RX ADMIN — SIMVASTATIN 40 MILLIGRAM(S): 20 TABLET, FILM COATED ORAL at 21:12

## 2021-05-03 RX ADMIN — MIRTAZAPINE 7.5 MILLIGRAM(S): 45 TABLET, ORALLY DISINTEGRATING ORAL at 21:12

## 2021-05-03 RX ADMIN — GABAPENTIN 400 MILLIGRAM(S): 400 CAPSULE ORAL at 05:05

## 2021-05-03 RX ADMIN — SERTRALINE 100 MILLIGRAM(S): 25 TABLET, FILM COATED ORAL at 12:33

## 2021-05-03 RX ADMIN — DIVALPROEX SODIUM 250 MILLIGRAM(S): 500 TABLET, DELAYED RELEASE ORAL at 21:12

## 2021-05-03 RX ADMIN — Medication 3: at 18:12

## 2021-05-03 RX ADMIN — DIVALPROEX SODIUM 250 MILLIGRAM(S): 500 TABLET, DELAYED RELEASE ORAL at 05:06

## 2021-05-03 RX ADMIN — Medication 100 MICROGRAM(S): at 05:05

## 2021-05-03 RX ADMIN — ENOXAPARIN SODIUM 100 MILLIGRAM(S): 100 INJECTION SUBCUTANEOUS at 18:13

## 2021-05-03 RX ADMIN — GABAPENTIN 400 MILLIGRAM(S): 400 CAPSULE ORAL at 18:13

## 2021-05-03 RX ADMIN — ENOXAPARIN SODIUM 100 MILLIGRAM(S): 100 INJECTION SUBCUTANEOUS at 05:06

## 2021-05-03 RX ADMIN — TIOTROPIUM BROMIDE AND OLODATEROL 2 PUFF(S): 3.124; 2.736 SPRAY, METERED RESPIRATORY (INHALATION) at 18:14

## 2021-05-03 RX ADMIN — Medication 0: at 21:12

## 2021-05-03 RX ADMIN — Medication 10 MILLIGRAM(S): at 21:12

## 2021-05-03 NOTE — BH CONSULTATION LIAISON PROGRESS NOTE - NSBHCHARTREVIEWVS_PSY_A_CORE FT
Vital Signs Last 24 Hrs  T(C): 36.9 (03 May 2021 11:46), Max: 36.9 (03 May 2021 11:46)  T(F): 98.5 (03 May 2021 11:46), Max: 98.5 (03 May 2021 11:46)  HR: 64 (03 May 2021 11:46) (60 - 69)  BP: 111/66 (03 May 2021 11:46) (111/66 - 155/75)  BP(mean): --  RR: 18 (03 May 2021 11:46) (16 - 18)  SpO2: 98% (03 May 2021 11:46) (98% - 100%)

## 2021-05-03 NOTE — DISCHARGE NOTE PROVIDER - HOSPITAL COURSE
64yo m pmhx parkinson's, copd, dm, covid in past unsure of exact dates, also recently intubated and extubated early april (pt says not from covid) sent to rehab where he ama'd yest because he had a  to go to is pw ems for shortness of breath. wife at bedside reports he has been having episodes where he becomes very pale and diaphoretic. he is also having night sweats. also reports having mild increase in leg size and is visibly shortness of breath tonight. Denies recent trauma, fevers, headache, dizziness, nausea, vomiting, dysuria, freq, hematuria, diarrhea, constipation, chest pain, cough.    subsegmental Pulmonary embolism.   - CTA chest read with R subsegmental PE -  RLL segmental clot present as well  -Likely 2nd to recent hospitalization and recent COVID infection   -LE duplex with superficial clot   -TTE with no evidence of R heart strain   -Pt seems high risk for full dose AC given hx of recent falls and unsteadiness from Parkinson's, but given clot in segmental artery as well as extending into subsegmental artery, favor full dose AC for at least 3 months.  -Prior to March, pt with no hx of falls as per wife (discussed over phone)  -Check O2 sats on RA at rest and with exertion, keep sats >88% with supplemental O2 PRN    SOB (shortness of breath).    -Given location of PE, doubt PE is cause of SOB. Suspect underlying lung disease +/- drug use playing a role in SOB.  -Check urine tox screen.    COPD (chronic obstructive pulmonary disease).   ~48 pack year hx, diagnosed with COPD as an outpatient by his PCP   -Quit smoking about 2 months ago  -Non compliant with inhalers at home  -Start Stiolto 2 puffs qd (use with spacer)  -Start albuterol 2 puffs q6h PRN (use with spacer).    COVID-19.    by hx - about 3-4 weeks ago  -No significant GGO on CTA  -No indication for Remdesivir or Decadron at this time   -ID f/u.    tob use d/o  - nicotine patch  - tob cessation counselling    anemia  - check iron studies    parkinson's disease  - cont home meds    h/o drug abuse  - psych eval          Electronic Signatures:  Sharlene Dougherty)  (Signed 03-May-2021 16:18)  	Authored: Progress Note, Reason for Admission, Subjective and Objective, Assessment and Plan      Last Updated: 03-May-2021 16:18 by Sharlene Dougherty)

## 2021-05-03 NOTE — BH CONSULTATION LIAISON PROGRESS NOTE - NSBHASSESSMENTFT_PSY_ALL_CORE
Patient is a 64yo male, , domiciled with family, retired [place] ,  , PPH long history of cocaine and opioid use, multiple rehab admissions, no prior suicide attempt, no prior psych admission, with PMHx parkinson's, copd, dm, covid in past unsure of exact dates, also recently intubated and extubated early April (pt says not from covid) sent to rehab where he recently left AMA to attend a , presents with SOB secondary to covid infection. pt remains compliant with his psychiatric medications, no side effects reported. Resting tremors exist due to Parkinson's disease. pt denies si/hi, sleep and appetite fair. denies acute complaints.

## 2021-05-03 NOTE — DISCHARGE NOTE PROVIDER - NSDCMRMEDTOKEN_GEN_ALL_CORE_FT
Depakote 250 mg oral delayed release tablet: 1 tab(s) orally 3 times a day  DuoNeb 0.5 mg-2.5 mg/3 mL inhalation solution: 3 milliliter(s) inhaled 4 times a day, As Needed  Melatonin 10 mg oral capsule: 1 cap(s) orally once a day (at bedtime)  Neurontin 400 mg oral capsule: orally 2 times a day  OLANZapine 5 mg oral tablet: 1 tab(s) orally 2 times a day  oxyCODONE 5 mg oral tablet: 1 tab(s) orally every 6 hours, As Needed -for severe pain MDD:4 tablets  Remeron: 7.5 milligram(s) orally once a day (at bedtime)  sertraline 100 mg oral tablet: 1 tab(s) orally once a day  simvastatin 40 mg oral tablet: 1 tab(s) orally once a day (at bedtime)  Synthroid 100 mcg (0.1 mg) oral tablet: 1 tab(s) orally once a day  Vitamin B1: 1 tab(s) orally once a day   Depakote 250 mg oral delayed release tablet: 1 tab(s) orally 3 times a day  DuoNeb 0.5 mg-2.5 mg/3 mL inhalation solution: 3 milliliter(s) inhaled 4 times a day, As Needed  Melatonin 10 mg oral capsule: 1 cap(s) orally once a day (at bedtime)  Neurontin 400 mg oral capsule: orally 2 times a day  OLANZapine 5 mg oral tablet: 1 tab(s) orally 2 times a day  oxyCODONE 5 mg oral tablet: 1 tab(s) orally every 6 hours, As Needed -for severe pain MDD:4 tablets  Remeron: 7.5 milligram(s) orally once a day (at bedtime)  Rolling walker: 1   sertraline 100 mg oral tablet: 1 tab(s) orally once a day  simvastatin 40 mg oral tablet: 1 tab(s) orally once a day (at bedtime)  Synthroid 100 mcg (0.1 mg) oral tablet: 1 tab(s) orally once a day  Vitamin B1: 1 tab(s) orally once a day   Depakote 250 mg oral delayed release tablet: 1 tab(s) orally 3 times a day  DuoNeb 0.5 mg-2.5 mg/3 mL inhalation solution: 3 milliliter(s) inhaled 4 times a day, As Needed  Eliquis Starter Pack for Treatment of DVT and PE 5 mg oral tablet: 1 tab(s) orally 2 times a day   Melatonin 10 mg oral capsule: 1 cap(s) orally once a day (at bedtime)  Neurontin 400 mg oral capsule: orally 2 times a day  OLANZapine 5 mg oral tablet: 1 tab(s) orally 2 times a day  oxyCODONE 5 mg oral tablet: 1 tab(s) orally every 6 hours, As Needed -for severe pain MDD:4 tablets  Remeron: 7.5 milligram(s) orally once a day (at bedtime)  Rolling walker: 1   sertraline 100 mg oral tablet: 1 tab(s) orally once a day  simvastatin 40 mg oral tablet: 1 tab(s) orally once a day (at bedtime)  Synthroid 100 mcg (0.1 mg) oral tablet: 1 tab(s) orally once a day  Vitamin B1: 1 tab(s) orally once a day   Depakote 250 mg oral delayed release tablet: 1 tab(s) orally 3 times a day  DuoNeb 0.5 mg-2.5 mg/3 mL inhalation solution: 3 milliliter(s) inhaled 4 times a day, As Needed  Eliquis Starter Pack for Treatment of DVT and PE 5 mg oral tablet: 1 tab(s) orally 2 times a day   Melatonin 10 mg oral capsule: 1 cap(s) orally once a day (at bedtime)  Neurontin 400 mg oral capsule: orally 2 times a day  OLANZapine 5 mg oral tablet: 1 tab(s) orally 2 times a day  Remeron: 7.5 milligram(s) orally once a day (at bedtime)  Rolling walker: 1   sertraline 100 mg oral tablet: 1 tab(s) orally once a day  simvastatin 40 mg oral tablet: 1 tab(s) orally once a day (at bedtime)  Synthroid 100 mcg (0.1 mg) oral tablet: 1 tab(s) orally once a day  Vitamin B1: 1 tab(s) orally once a day   albuterol 90 mcg/inh inhalation aerosol: 2 puff(s) inhaled every 6 hours, As Needed  ICD J12.82   Depakote 250 mg oral delayed release tablet: 1 tab(s) orally 3 times a day  Eliquis Starter Pack for Treatment of DVT and PE 5 mg oral tablet: 1 tab(s) orally 2 times a day   Melatonin 10 mg oral capsule: 1 cap(s) orally once a day (at bedtime)  Neurontin 400 mg oral capsule: orally 2 times a day  OLANZapine 5 mg oral tablet: 1 tab(s) orally 2 times a day  predniSONE 10 mg oral tablet: 3 tab(s) orally once a day   Remeron: 7.5 milligram(s) orally once a day (at bedtime)  Rolling walker: 1   sertraline 100 mg oral tablet: 1 tab(s) orally once a day  simvastatin 40 mg oral tablet: 1 tab(s) orally once a day (at bedtime)  Spiriva HandiHaler 18 mcg inhalation capsule: 1 cap(s) inhaled once a day   Symbicort 160 mcg-4.5 mcg/inh inhalation aerosol: 2 puff(s) inhaled 2 times a day   Synthroid 100 mcg (0.1 mg) oral tablet: 1 tab(s) orally once a day  Vitamin B1: 1 tab(s) orally once a day

## 2021-05-03 NOTE — PROVIDER CONTACT NOTE (OTHER) - BACKGROUND
Admitted for SOB; found to have PE 2/2 to Covid. PMHx of COPD, Parkinson's disease, hyperthyroid, and pneumothorax. Pt HR varies from 50-70s on tele
Patient with PE, COVID positive, SOB

## 2021-05-03 NOTE — CONSULT NOTE ADULT - PROBLEM SELECTOR RECOMMENDATION 2
~48 pack year hx, diagnosed with COPD as an outpatient by his PCP   -Quit smoking about 2 months ago  -Non compliant with inhalers at home  -Start Stiolto 2 puffs qd (use with spacer)  -Start albuterol 2 puffs q6h PRN (use with spacer) -Given location of PE, doubt PE is cause of SOB. Suspect underlying lung disease +/- drug use playing a role in SOB.  -Check urine tox screen

## 2021-05-03 NOTE — PHYSICAL THERAPY INITIAL EVALUATION ADULT - ADDITIONAL COMMENTS
as per pt, resides in a  with family, 6 stairs to enter, one flight up to bedroom, PTA, pt amb (I), (I) with ADLs, recently sign off AMA from rehab

## 2021-05-03 NOTE — PROVIDER CONTACT NOTE (OTHER) - ASSESSMENT
Pt was sleeping at the time of event. Pt awakened by RN, and HR went back to NSR. VSS. Pt has no c/o of CP, SOB, and/or palpitations.

## 2021-05-03 NOTE — CONSULT NOTE ADULT - PROBLEM SELECTOR RECOMMENDATION 9
CTA chest read with R subsegmental PE - reviewed scan with chest radiology, RLL segmental clot present as well  -Likely 2nd to recent hospitalization and recent COVID infection   -LE duplex with superficial clot   -TTE with no evidence of R heart strain   -Pt seems high risk for full dose AC given hx of recent falls and unsteadiness from Parkinson's, but given clot in segmental artery as well as extending into subsegmental artery, favor full dose AC for at least 3 months.  -Prior to March, pt with no hx of falls as per wife (discussed over phone)  -Check O2 sats on RA at rest and with exertion, keep sats >88% with supplemental O2 PRN

## 2021-05-03 NOTE — BH CONSULTATION LIAISON PROGRESS NOTE - NSBHFUPINTERVALHXFT_PSY_A_CORE
pt seen, AOA x 3, denies acute complaints, here for COVID infection, denies depression/anxiety, no si/hi, and sleep and appetite fair. no current manic or psychotic symptoms present. compliant with his psychiatric medications, no side effects reported. no behavioral issues reported.

## 2021-05-03 NOTE — CONSULT NOTE ADULT - ATTENDING COMMENTS
Patient had prior positive COVID19 swab >2 weeks ago  CTA Chest with no COVID19 infiltrates but with PE  I suspect current presentation (hypoxia) is secondary to PE  I doubt need for Remdesivir or Dexamethasone at this time  Management of PE per primary team     Overall, COVID19, Hypoxic respiratory Failure, PE    I will follow the patient as needed. Please feel free to contact me with any further questions or concerns.    Valentino Whitfield M.D.  [facility] Division of Infectious Disease  8AM-5PM: Pager Number [phone]  After Hours (or if no response): Please contact the Infectious Diseases Office at [phone]     The above assessment and plan were discussed with medicine team
agree w above

## 2021-05-03 NOTE — PROVIDER CONTACT NOTE (OTHER) - RECOMMENDATIONS
Notify provider. No need for intervention because pt was sleeping & asymptomatic
R2 pads placed at bedside

## 2021-05-03 NOTE — DISCHARGE NOTE PROVIDER - NPI NUMBER (FOR SYSADMIN USE ONLY) :
[[id number] [[id number]] [[id number]]
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
## 2021-05-03 NOTE — PROVIDER CONTACT NOTE (OTHER) - SITUATION
Patient had an episode of bradycardia. Went down to 34 bpm
Pt had brief episode of bradycardia with HR of 39 bpm.

## 2021-05-03 NOTE — DISCHARGE NOTE PROVIDER - CARE PROVIDER_API CALL
[facility]  [facility] Medicine  [address] [place] [address]  Phone: [phone]  Fax: [phone]  Follow Up Time:     KIERSTEN GAYLE  Neurology  [address] [address]  Phone: [phone]  Fax: [phone]  Follow Up Time:    BLANCHE MAURO [place]  Internal Medicine  [address] [place] [address]  Phone: [phone]  Fax: [phone]  Follow Up Time:     KIERSTEN GAYLE  Neurology  [address] [facility] [address]  [place] [address]  Phone: [phone]  Fax: [phone]  Follow Up Time:     Montez Fisher  CARDIOVASCULAR DISEASE  [address] [facility] [address]  [place] [address]  Phone: [phone]  Fax: [phone]  Follow Up Time:    BLANCHE MAURO  Internal Medicine  [address] [place] [address]  Phone: [phone]  Fax: [phone]  Follow Up Time:     KIERSTEN GAYLE  Neurology  [address] [facility] [address]  [place] [address]  Phone: [phone]  Fax: [phone]  Follow Up Time:     Montez Fisher  CARDIOVASCULAR DISEASE  [address] [facility] [address]  [place] [address]  Phone: [phone]  Fax: [phone]  Follow Up Time:     Augustine Tapia  INTERNAL MEDICINE  [address] [place] [address]  Phone: [phone]  Fax: [phone]  Follow Up Time:

## 2021-05-03 NOTE — DISCHARGE NOTE PROVIDER - CARE PROVIDERS DIRECT ADDRESSES
,DirectAddress_Unknown,DirectAddress_Unknown ,DirectAddress_Unknown,DirectAddress_Unknown,DirectAddress_Unknown ,DirectAddress_Unknown,DirectAddress_Unknown,DirectAddress_Unknown,[email or web address]

## 2021-05-03 NOTE — DISCHARGE NOTE PROVIDER - NSDCCPCAREPLAN_GEN_ALL_CORE_FT
PRINCIPAL DISCHARGE DIAGNOSIS  Diagnosis: PE (pulmonary thromboembolism)  Assessment and Plan of Treatment: Take your anticoagulation (lovenox, coumadin) as directed.  Follow up with your health care provider within one week. Call for appointment.  If you develop shortness of breath or if your shortness of breath worsens call your Health Care Provider or go to the Emergency Department.        SECONDARY DISCHARGE DIAGNOSES  Diagnosis: Suspected DVT (deep vein thrombosis)  Assessment and Plan of Treatment: Take anticoagulation as prescribed  Walking is encouraged, increase activity as tolerated.  If you develop new leg pain, swelling, and/or redness contact your healthcare provider.  If you develop new chest pain with difficulty breathing, a rapid heart rate and/or a feeling of passing out call emergency medical services 911.      Diagnosis: Parkinson's disease  Assessment and Plan of Treatment: Follow up with neurology as directed for managment    Diagnosis: Drug abuse  Assessment and Plan of Treatment: Follow up at [facility]    Diagnosis: Anemia  Assessment and Plan of Treatment: Follow up with PMD for management and repeat lab work    Diagnosis: History of COVID-19  Assessment and Plan of Treatment: Hx of recent covid infection. Seen by Infectious disease, no treatment warrented. You have covid antibodies from previous infection     PRINCIPAL DISCHARGE DIAGNOSIS  Diagnosis: PE (pulmonary thromboembolism)  Assessment and Plan of Treatment: Take your anticoagulation (lovenox, coumadin) as directed.  Follow up with your health care provider within one week. Call for appointment.  If you develop shortness of breath or if your shortness of breath worsens call your Health Care Provider or go to the Emergency Department.        SECONDARY DISCHARGE DIAGNOSES  Diagnosis: Suspected DVT (deep vein thrombosis)  Assessment and Plan of Treatment: Take anticoagulation as prescribed  Walking is encouraged, increase activity as tolerated.  If you develop new leg pain, swelling, and/or redness contact your healthcare provider.  If you develop new chest pain with difficulty breathing, a rapid heart rate and/or a feeling of passing out call emergency medical services 911.      Diagnosis: Parkinson's disease  Assessment and Plan of Treatment: Follow up with neurology as directed for managment    Diagnosis: Drug abuse  Assessment and Plan of Treatment: Follow up at [facility]    Diagnosis: Anemia  Assessment and Plan of Treatment: Follow up with PMD for management and repeat lab work    Diagnosis: History of COVID-19  Assessment and Plan of Treatment: Hx of recent covid infection. Seen by Infectious disease, no treatment warrented. You have covid antibodies from previous infection    Diagnosis: Bradycardia  Assessment and Plan of Treatment: Bradycardia is a low heart rate. Please follow up with PMD and you will need an outpatient sleep study to assess for obstructive sleep apnea.   Follow up with cardiology     PRINCIPAL DISCHARGE DIAGNOSIS  Diagnosis: PE (pulmonary thromboembolism)  Assessment and Plan of Treatment: Take your anticoagulation (lovenox, coumadin) as directed.  Follow up with your health care provider within one week. Call for appointment.  If you develop shortness of breath or if your shortness of breath worsens call your Health Care Provider or go to the Emergency Department.        SECONDARY DISCHARGE DIAGNOSES  Diagnosis: Suspected DVT (deep vein thrombosis)  Assessment and Plan of Treatment: Take anticoagulation as prescribed  Walking is encouraged, increase activity as tolerated.  If you develop new leg pain, swelling, and/or redness contact your healthcare provider.  If you develop new chest pain with difficulty breathing, a rapid heart rate and/or a feeling of passing out call emergency medical services 911.      Diagnosis: Parkinson's disease  Assessment and Plan of Treatment: Follow up with neurology as directed for managment    Diagnosis: Drug abuse  Assessment and Plan of Treatment: Follow up at [facility]    Diagnosis: Anemia  Assessment and Plan of Treatment: Follow up with PMD for management and repeat lab work    Diagnosis: History of COVID-19  Assessment and Plan of Treatment: Hx of recent covid infection. Seen by Infectious disease, no treatment warrented. You have covid antibodies from previous infection    Diagnosis: Bradycardia  Assessment and Plan of Treatment: Bradycardia is a low heart rate. Please follow up Dr. Tapia as you will need an outpatient sleep study to assess for obstructive sleep apnea.   Follow up with cardiology     PRINCIPAL DISCHARGE DIAGNOSIS  Diagnosis: PE (pulmonary thromboembolism)  Assessment and Plan of Treatment: Take Eliquis as directed: Start dose tonight at 6pm 5/5/2021.  Take 2 tabs ( 10mg) twice a day for 1 week only, then 5mg twice a day.   Follow up with your health care provider within one week. Call for appointment.  If you develop shortness of breath or if your shortness of breath worsens call your Health Care Provider or go to the Emergency Department.        SECONDARY DISCHARGE DIAGNOSES  Diagnosis: DVT of lower extremity (deep venous thrombosis)  Assessment and Plan of Treatment: Take eliquis as prescribed.   Walking is encouraged, increase activity as tolerated.  If you develop new leg pain, swelling, and/or redness contact your healthcare provider.  If you develop new chest pain with difficulty breathing, a rapid heart rate and/or a feeling of passing out call emergency medical services 911.      Diagnosis: Bradycardia  Assessment and Plan of Treatment: Bradycardia is a low heart rate. Please follow up Dr. Tapia as you will need an outpatient sleep study to assess for obstructive sleep apnea.   Follow up with cardiology    Diagnosis: Anemia  Assessment and Plan of Treatment: Follow up with PMD for management and repeat lab work    Diagnosis: Parkinson's disease  Assessment and Plan of Treatment: Follow up with neurology as directed for managment    Diagnosis: History of COPD  Assessment and Plan of Treatment: Call your Health Care provider upon arrival home to make a follow up appointment within one week.  Take all inhalers as prescribed by your Health Care Provider.  Take steroids as prescribed by your Health Care Provider.  If your cough increases infrequency and severity and/or you have shortness of breath or increased shortness of breath call your Health Care Provider.  If you develop fever, chills, night sweats, malaise, and/or change in mental status call your Health care Provider.  Nutrition is very important.  Eat small frequent meals.  Increase your activity as tolerated.  Do not stay in bed all day  Follow up with Dr. Tapia for managment   Continue 30mg of Prednisone, start tomorrow 5/6/2021      Diagnosis: History of COVID-19  Assessment and Plan of Treatment: Hx of recent covid infection. Seen by Infectious disease, no treatment warrented. You have covid antibodies from previous infection    Diagnosis: Drug abuse  Assessment and Plan of Treatment: Follow up at [facility] [place]      PRINCIPAL DISCHARGE DIAGNOSIS  Diagnosis: PE (pulmonary thromboembolism)  Assessment and Plan of Treatment: Take Eliquis as directed: Start dose tonight at 6pm 5/5/2021.  Take 2 tabs ( 10mg) twice a day for 1 week only, then 5mg twice a day.   Follow up with your health care provider within one week. Call for appointment.  If you develop shortness of breath or if your shortness of breath worsens call your Health Care Provider or go to the Emergency Department.  Follow up with Dr. Tapia and PMD         SECONDARY DISCHARGE DIAGNOSES  Diagnosis: DVT of lower extremity (deep venous thrombosis)  Assessment and Plan of Treatment: Take eliquis as prescribed.   Walking is encouraged, increase activity as tolerated.  If you develop new leg pain, swelling, and/or redness contact your healthcare provider.  If you develop new chest pain with difficulty breathing, a rapid heart rate and/or a feeling of passing out call emergency medical services 911.      Diagnosis: Bradycardia  Assessment and Plan of Treatment: Bradycardia is a low heart rate. Please follow up Dr. Tapia as you will need an outpatient sleep study to assess for obstructive sleep apnea.   Follow up with cardiology    Diagnosis: Anemia  Assessment and Plan of Treatment: Follow up with PMD for management and repeat lab work    Diagnosis: Parkinson's disease  Assessment and Plan of Treatment: Follow up with neurology as directed for managment    Diagnosis: History of COPD  Assessment and Plan of Treatment: Call your Health Care provider upon arrival home to make a follow up appointment within one week.  Take all inhalers as prescribed by your Health Care Provider.  Take steroids as prescribed by your Health Care Provider.  If your cough increases infrequency and severity and/or you have shortness of breath or increased shortness of breath call your Health Care Provider.  If you develop fever, chills, night sweats, malaise, and/or change in mental status call your Health care Provider.  Nutrition is very important.  Eat small frequent meals.  Increase your activity as tolerated.  Do not stay in bed all day  Follow up with Dr. Tapia for managment   Continue 30mg of Prednisone, start tomorrow 5/6/2021      Diagnosis: History of COVID-19  Assessment and Plan of Treatment: Hx of recent covid infection. Seen by Infectious disease, no treatment warrented. You have covid antibodies from previous infection    Diagnosis: Drug abuse  Assessment and Plan of Treatment: Follow up at [facility]  and OR private psychiatrist

## 2021-05-03 NOTE — CHART NOTE - NSCHARTNOTEFT_GEN_A_CORE
Medicine NP note    CC: unsustained Bradycardia  Notified by RN that patient had an episode of Bradycardia  with HR 39 BPM while pt asleep, Hr back to 70's while pt awake  patient asymptomatic    64yo m pmhx parkinson's, copd, dm, substance abuse,  covid (apprx 3 weeks ago) also recently intubated and extubated early april (pt says not from covid) sent to rehab where he ama'd, developed SOB at home x 2-3 days and chills.  Found to have Covid-19 and pulmonary embolism.    Unsustained SB  SB while sleeping, Baseline Hr 60's to 80's while awake, 40's to 60's while asleep  Pt not on BB/CCB  Will continue tele  Will continue to monitor    Jeancarlos [facility]  [id number]

## 2021-05-03 NOTE — DISCHARGE NOTE PROVIDER - PROVIDER TOKENS
PROVIDER:[TOKEN:[[id number]]],PROVIDER:[TOKEN:[[id number]]] PROVIDER:[TOKEN:[[id number]]],PROVIDER:[TOKEN:[[id number]]],PROVIDER:[TOKEN:[[id number]]] PROVIDER:[TOKEN:[[id number]]],PROVIDER:[TOKEN:[[id number]]],PROVIDER:[TOKEN:[[id number]]],PROVIDER:[TOKEN:[[id number]]]

## 2021-05-03 NOTE — CONSULT NOTE ADULT - PROBLEM SELECTOR RECOMMENDATION 4
reportedly last used about 2 months ago  -Per primary team by hx - about 3-4 weeks ago  -No significant GGO on CTA  -No indication for Remdesivir or Decadron at this time   -ID f/u

## 2021-05-03 NOTE — PHYSICAL THERAPY INITIAL EVALUATION ADULT - PERTINENT HX OF CURRENT PROBLEM, REHAB EVAL
64yo m pmhx parkinson's, copd, dm, covid in past unsure of exact dates, also recently intubated and extubated early april (pt says not from covid) sent to rehab where he ama'd yest because he had a  to go to is pw ems for shortness of breath. CTA , +pulmonary embolism Rt lower lobe. VA duplx b/l LEs , + DVT LLE, CXR (-).

## 2021-05-03 NOTE — PHYSICAL THERAPY INITIAL EVALUATION ADULT - PLANNED THERAPY INTERVENTIONS, PT EVAL
stairs; GOAL: patient will be able to negotiated 12 stairs (I) with one HR in 2 weeks/bed mobility training/gait training/transfer training

## 2021-05-03 NOTE — CONSULT NOTE ADULT - PROBLEM SELECTOR RECOMMENDATION 3
by hx - about 3-4 weeks ago  -No significant GGO on CTA  -No indication for Remdesivir or Decadron at this time   -ID f/u ~48 pack year hx, diagnosed with COPD as an outpatient by his PCP   -Quit smoking about 2 months ago  -Non compliant with inhalers at home  -Start Stiolto 2 puffs qd (use with spacer)  -Start albuterol 2 puffs q6h PRN (use with spacer)

## 2021-05-03 NOTE — PROVIDER CONTACT NOTE (OTHER) - ACTION/TREATMENT ORDERED:
Continue to monitor. NP will contact cardiology.
Provider notified and agrees. No new orders at this time. Will continue to monitor for increasing events of bradycardia

## 2021-05-03 NOTE — BH CONSULTATION LIAISON PROGRESS NOTE - CURRENT MEDICATION
MEDICATIONS  (STANDING):  dextrose 40% Gel 15 Gram(s) Oral once  dextrose 5%. 1000 milliLiter(s) (50 mL/Hr) IV Continuous <Continuous>  dextrose 5%. 1000 milliLiter(s) (100 mL/Hr) IV Continuous <Continuous>  dextrose 50% Injectable 25 Gram(s) IV Push once  dextrose 50% Injectable 12.5 Gram(s) IV Push once  dextrose 50% Injectable 25 Gram(s) IV Push once  diVALproex  milliGRAM(s) Oral three times a day  enoxaparin Injectable 100 milliGRAM(s) SubCutaneous two times a day  gabapentin 400 milliGRAM(s) Oral two times a day  glucagon  Injectable 1 milliGRAM(s) IntraMuscular once  insulin lispro (ADMELOG) corrective regimen sliding scale   SubCutaneous three times a day before meals  insulin lispro (ADMELOG) corrective regimen sliding scale   SubCutaneous at bedtime  levothyroxine 100 MICROGram(s) Oral daily  melatonin 10 milliGRAM(s) Oral at bedtime  mirtazapine 7.5 milliGRAM(s) Oral at bedtime  OLANZapine 5 milliGRAM(s) Oral two times a day  sertraline 100 milliGRAM(s) Oral daily  simvastatin 40 milliGRAM(s) Oral at bedtime  tiotropium 2.5 MICROgram(s)/olodaterol 2.5 MICROgram(s) (STIOLTO) Inhaler 2 Puff(s) Inhalation daily    MEDICATIONS  (PRN):  ALBUTerol    90 MICROgram(s) HFA Inhaler 2 Puff(s) Inhalation every 6 hours PRN Shortness of Breath and/or Wheezing

## 2021-05-04 LAB
ALBUMIN SERPL ELPH-MCNC: 3.4 G/DL — SIGNIFICANT CHANGE UP (ref 3.3–5)
ALP SERPL-CCNC: 57 U/L — SIGNIFICANT CHANGE UP (ref 40–120)
ALT FLD-CCNC: 7 U/L — LOW (ref 10–45)
ANION GAP SERPL CALC-SCNC: 15 MMOL/L — SIGNIFICANT CHANGE UP (ref 5–17)
AST SERPL-CCNC: 9 U/L — LOW (ref 10–40)
BILIRUB SERPL-MCNC: 0.2 MG/DL — SIGNIFICANT CHANGE UP (ref 0.2–1.2)
BUN SERPL-MCNC: 16 MG/DL — SIGNIFICANT CHANGE UP (ref 7–23)
CALCIUM SERPL-MCNC: 9 MG/DL — SIGNIFICANT CHANGE UP (ref 8.4–10.5)
CHLORIDE SERPL-SCNC: 100 MMOL/L — SIGNIFICANT CHANGE UP (ref 96–108)
CO2 SERPL-SCNC: 27 MMOL/L — SIGNIFICANT CHANGE UP (ref 22–31)
CREAT SERPL-MCNC: 0.64 MG/DL — SIGNIFICANT CHANGE UP (ref 0.5–1.3)
GLUCOSE BLDC GLUCOMTR-MCNC: 139 MG/DL — HIGH (ref 70–99)
GLUCOSE BLDC GLUCOMTR-MCNC: 151 MG/DL — HIGH (ref 70–99)
GLUCOSE BLDC GLUCOMTR-MCNC: 232 MG/DL — HIGH (ref 70–99)
GLUCOSE BLDC GLUCOMTR-MCNC: 256 MG/DL — HIGH (ref 70–99)
GLUCOSE SERPL-MCNC: 145 MG/DL — HIGH (ref 70–99)
HCT VFR BLD CALC: 31.9 % — LOW (ref 39–50)
HGB BLD-MCNC: 10 G/DL — LOW (ref 13–17)
MAGNESIUM SERPL-MCNC: 1.4 MG/DL — LOW (ref 1.6–2.6)
MCHC RBC-ENTMCNC: 29.9 PG — SIGNIFICANT CHANGE UP (ref 27–34)
MCHC RBC-ENTMCNC: 31.3 GM/DL — LOW (ref 32–36)
MCV RBC AUTO: 95.2 FL — SIGNIFICANT CHANGE UP (ref 80–100)
NRBC # BLD: 0 /100 WBCS — SIGNIFICANT CHANGE UP (ref 0–0)
PCP SPEC-MCNC: SIGNIFICANT CHANGE UP
PHOSPHATE SERPL-MCNC: 4.4 MG/DL — SIGNIFICANT CHANGE UP (ref 2.5–4.5)
PLATELET # BLD AUTO: 377 K/UL — SIGNIFICANT CHANGE UP (ref 150–400)
POTASSIUM SERPL-MCNC: 3.9 MMOL/L — SIGNIFICANT CHANGE UP (ref 3.5–5.3)
POTASSIUM SERPL-SCNC: 3.9 MMOL/L — SIGNIFICANT CHANGE UP (ref 3.5–5.3)
PROT SERPL-MCNC: 6.7 G/DL — SIGNIFICANT CHANGE UP (ref 6–8.3)
RBC # BLD: 3.35 M/UL — LOW (ref 4.2–5.8)
RBC # FLD: 15.2 % — HIGH (ref 10.3–14.5)
SODIUM SERPL-SCNC: 142 MMOL/L — SIGNIFICANT CHANGE UP (ref 135–145)
WBC # BLD: 8.73 K/UL — SIGNIFICANT CHANGE UP (ref 3.8–10.5)
WBC # FLD AUTO: 8.73 K/UL — SIGNIFICANT CHANGE UP (ref 3.8–10.5)

## 2021-05-04 PROCEDURE — 93010 ELECTROCARDIOGRAM REPORT: CPT

## 2021-05-04 RX ORDER — ALBUTEROL 90 UG/1
2 AEROSOL, METERED ORAL EVERY 6 HOURS
Refills: 0 | Status: DISCONTINUED | OUTPATIENT
Start: 2021-05-04 | End: 2021-05-05

## 2021-05-04 RX ORDER — MAGNESIUM SULFATE 500 MG/ML
2 VIAL (ML) INJECTION ONCE
Refills: 0 | Status: COMPLETED | OUTPATIENT
Start: 2021-05-04 | End: 2021-05-04

## 2021-05-04 RX ADMIN — Medication 50 GRAM(S): at 12:33

## 2021-05-04 RX ADMIN — Medication 10 MILLIGRAM(S): at 21:46

## 2021-05-04 RX ADMIN — OLANZAPINE 5 MILLIGRAM(S): 15 TABLET, FILM COATED ORAL at 05:06

## 2021-05-04 RX ADMIN — ENOXAPARIN SODIUM 100 MILLIGRAM(S): 100 INJECTION SUBCUTANEOUS at 05:06

## 2021-05-04 RX ADMIN — ALBUTEROL 2 PUFF(S): 90 AEROSOL, METERED ORAL at 20:00

## 2021-05-04 RX ADMIN — DIVALPROEX SODIUM 250 MILLIGRAM(S): 500 TABLET, DELAYED RELEASE ORAL at 05:06

## 2021-05-04 RX ADMIN — OLANZAPINE 5 MILLIGRAM(S): 15 TABLET, FILM COATED ORAL at 17:19

## 2021-05-04 RX ADMIN — MIRTAZAPINE 7.5 MILLIGRAM(S): 45 TABLET, ORALLY DISINTEGRATING ORAL at 21:46

## 2021-05-04 RX ADMIN — ENOXAPARIN SODIUM 100 MILLIGRAM(S): 100 INJECTION SUBCUTANEOUS at 17:19

## 2021-05-04 RX ADMIN — SIMVASTATIN 40 MILLIGRAM(S): 20 TABLET, FILM COATED ORAL at 21:46

## 2021-05-04 RX ADMIN — Medication 1: at 17:19

## 2021-05-04 RX ADMIN — DIVALPROEX SODIUM 250 MILLIGRAM(S): 500 TABLET, DELAYED RELEASE ORAL at 13:08

## 2021-05-04 RX ADMIN — Medication 3: at 12:33

## 2021-05-04 RX ADMIN — GABAPENTIN 400 MILLIGRAM(S): 400 CAPSULE ORAL at 17:18

## 2021-05-04 RX ADMIN — Medication 100 MICROGRAM(S): at 05:06

## 2021-05-04 RX ADMIN — Medication 40 MILLIGRAM(S): at 17:20

## 2021-05-04 RX ADMIN — GABAPENTIN 400 MILLIGRAM(S): 400 CAPSULE ORAL at 05:06

## 2021-05-04 RX ADMIN — SERTRALINE 100 MILLIGRAM(S): 25 TABLET, FILM COATED ORAL at 08:24

## 2021-05-04 RX ADMIN — DIVALPROEX SODIUM 250 MILLIGRAM(S): 500 TABLET, DELAYED RELEASE ORAL at 21:46

## 2021-05-04 RX ADMIN — TIOTROPIUM BROMIDE AND OLODATEROL 2 PUFF(S): 3.124; 2.736 SPRAY, METERED RESPIRATORY (INHALATION) at 08:24

## 2021-05-04 RX ADMIN — ALBUTEROL 2 PUFF(S): 90 AEROSOL, METERED ORAL at 14:10

## 2021-05-04 NOTE — PROGRESS NOTE ADULT - SUBJECTIVE AND OBJECTIVE BOX
DATE OF SERVICE: 05-04-21 @ 18:57    Patient is a 65y old  Male who presents with a chief complaint of SOB (04 May 2021 14:31)      SUBJECTIVE / OVERNIGHT EVENTS:  No chest pain. No shortness of breath. No complaints. No events overnight.     MEDICATIONS  (STANDING):  ALBUTerol    90 MICROgram(s) HFA Inhaler 2 Puff(s) Inhalation every 6 hours  dextrose 40% Gel 15 Gram(s) Oral once  dextrose 5%. 1000 milliLiter(s) (50 mL/Hr) IV Continuous <Continuous>  dextrose 5%. 1000 milliLiter(s) (100 mL/Hr) IV Continuous <Continuous>  dextrose 50% Injectable 25 Gram(s) IV Push once  dextrose 50% Injectable 12.5 Gram(s) IV Push once  dextrose 50% Injectable 25 Gram(s) IV Push once  diVALproex  milliGRAM(s) Oral three times a day  enoxaparin Injectable 100 milliGRAM(s) SubCutaneous two times a day  gabapentin 400 milliGRAM(s) Oral two times a day  glucagon  Injectable 1 milliGRAM(s) IntraMuscular once  insulin lispro (ADMELOG) corrective regimen sliding scale   SubCutaneous three times a day before meals  insulin lispro (ADMELOG) corrective regimen sliding scale   SubCutaneous at bedtime  levothyroxine 100 MICROGram(s) Oral daily  melatonin 10 milliGRAM(s) Oral at bedtime  mirtazapine 7.5 milliGRAM(s) Oral at bedtime  OLANZapine 5 milliGRAM(s) Oral two times a day  predniSONE   Tablet 40 milliGRAM(s) Oral daily  sertraline 100 milliGRAM(s) Oral daily  simvastatin 40 milliGRAM(s) Oral at bedtime  tiotropium 2.5 MICROgram(s)/olodaterol 2.5 MICROgram(s) (STIOLTO) Inhaler 2 Puff(s) Inhalation daily    MEDICATIONS  (PRN):      Vital Signs Last 24 Hrs  T(C): 36.4 (04 May 2021 17:59), Max: 36.8 (03 May 2021 21:11)  T(F): 97.5 (04 May 2021 17:59), Max: 98.2 (03 May 2021 21:11)  HR: 72 (04 May 2021 17:59) (59 - 75)  BP: 152/82 (04 May 2021 17:59) (117/68 - 152/82)  BP(mean): --  RR: 20 (04 May 2021 17:59) (18 - 20)  SpO2: 98% (04 May 2021 17:59) (92% - 100%)  CAPILLARY BLOOD GLUCOSE      POCT Blood Glucose.: 151 mg/dL (04 May 2021 17:09)  POCT Blood Glucose.: 256 mg/dL (04 May 2021 12:07)  POCT Blood Glucose.: 139 mg/dL (04 May 2021 08:13)  POCT Blood Glucose.: 220 mg/dL (03 May 2021 21:07)    I&O's Summary    03 May 2021 07:01  -  04 May 2021 07:00  --------------------------------------------------------  IN: 50 mL / OUT: 950 mL / NET: -900 mL    04 May 2021 07:01  -  04 May 2021 18:57  --------------------------------------------------------  IN: 350 mL / OUT: 200 mL / NET: 150 mL        PHYSICAL EXAM:  GENERAL: NAD, well-developed  HEAD:  Atraumatic, Normocephalic  EYES: EOMI, PERRLA, conjunctiva and sclera clear  NECK: Supple, No JVD  CHEST/LUNG: Clear to auscultation bilaterally; No wheeze  HEART: Regular rate and rhythm; No murmurs, rubs, or gallops  ABDOMEN: Soft, Nontender, Nondistended; Bowel sounds present  EXTREMITIES:  2+ Peripheral Pulses, No clubbing, cyanosis, or edema  PSYCH: AAOx3  NEUROLOGY: non-focal  SKIN: No rashes or lesions    LABS:                        10.0   8.73  )-----------( 377      ( 04 May 2021 05:30 )             31.9     05-04    142  |  100  |  16  ----------------------------<  145<H>  3.9   |  27  |  0.64    Ca    9.0      04 May 2021 05:30  Phos  4.4     05-04  Mg     1.4     05-04    TPro  6.7  /  Alb  3.4  /  TBili  0.2  /  DBili  x   /  AST  9<L>  /  ALT  7<L>  /  AlkPhos  57  05-04              RADIOLOGY & ADDITIONAL TESTS:    Imaging Personally Reviewed:    Consultant(s) Notes Reviewed:      Care Discussed with Consultants/Other Providers:

## 2021-05-04 NOTE — PROGRESS NOTE ADULT - PROBLEM SELECTOR PLAN 3
~48 pack year hx, diagnosed with COPD as an outpatient by his PCP   -Quit smoking about 2 months ago  -Non compliant with inhalers at home  -Stiolto 2 puffs qd (use with spacer)  -Change albuterol from PRN to standing q6 hours  -Suspect underlying lung disease playing a role in mild hypoxia ~48 pack year hx, diagnosed with COPD as an outpatient by his PCP   -Quit smoking about 2 months ago  -Non compliant with inhalers at home  -Stiolto 2 puffs qd (use with spacer)  -Change albuterol from PRN to standing q6 hours  -Suspect underlying lung disease playing a role in mild hypoxia. Start solumedrol 20mg IVP q8h and observe. ~48 pack year hx, diagnosed with COPD as an outpatient by his PCP   -Quit smoking about 2 months ago  -Non compliant with inhalers at home  -Stiolto 2 puffs qd (use with spacer)  -Change albuterol from PRN to standing q6 hours  -Suspect underlying lung disease playing a role in mild hypoxia. Start prednisone 40mg PO qd and observe.

## 2021-05-04 NOTE — CONSULT NOTE ADULT - ASSESSMENT
Sinus bradycardia  episodic   P-P prolongation   happens during sleep   appears to be vagally mediated likely from apneic episodes  highly suspect underlying CODIE  recommend to have sleep study as outpt   fu with pulm   avoid avn blockers    atach with aberrant conduction   due to PE   avoid avn blockers given bradycardia    PE   on a/c  no evidence of right heart strain     
64yo m pmhx parkinson's, copd, dm, covid(apprx 3 weeks ago) also recently intubated and extubated early april (pt says not from covid) sent to rehab where he ama'd, developed SOB at home x 2-3 days and chills.  Found to have Covid-19 and pulmonary embolism.      #COVID-19, pulmonary embolism - suspect PE due to Covid inflammation. Currently requiring 3L NC.  Reports initial positive 3 weeks ago at rehab and never before.  Now on full a/c, pending LE US.  CT shows PE and no evidence of PNA.   No cough or sputum production    Rec:   - check covid abs  - maintain isolation  - full a/c for PE  - would hold off on steroids and remdesivir as he reports 3 weeks out and PE explains hypoxia  - monitor inflammatory markers q3d    Cordell Rowell MD  Fellow, Infectious Diseases, PGY-4  Pager: [phone]  Before 9am or after 5pm/Weekends: Call [phone]    
66 y/o M with PMH Parkinson's, COPD, DM, covid in past unsure of exact dates reportedly 3-4 weeks ago while in rehab), recent falls, also recently intubated and extubated at [facility] (3/17 - 4/4 or 4/5) reportedly for CHF exacerbation. D/c'd to rehab. Now presents to ED with SOB, diaphoresis, night sweats, increase in leg size. CTA chest read with R subsegmental PE - reviewed scan with chest radiology, RLL segmental clot present as well.

## 2021-05-04 NOTE — DISCHARGE NOTE NURSING/CASE MANAGEMENT/SOCIAL WORK - PATIENT PORTAL LINK FT
You can access the FollowMyHealth Patient Portal offered by [facility] by registering at the following website: [email or web address]. By joining [facility]’s FollowMyHealth portal, you will also be able to view your health information using other applications (apps) compatible with our system.
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
## 2021-05-04 NOTE — PROGRESS NOTE ADULT - PROBLEM SELECTOR PLAN 1
CTA chest read with R subsegmental PE - reviewed scan with chest radiology, RLL segmental clot present as well  -Likely 2nd to recent hospitalization and recent COVID infection   -LE duplex with superficial clot   -TTE with no evidence of R heart strain   -Pt seems high risk for full dose AC given hx of recent falls and unsteadiness from Parkinson's, but given clot in segmental artery as well as extending into subsegmental artery, favor full dose AC for at least 3 months.  -Prior to March, pt with no hx of falls as per wife (discussed over phone)  -Continue to monitor o2 sats on RA at rest and with exertion  -wean o2 as tolerated, keep sats >88% with supplemental O2 PRN CTA chest read with R subsegmental PE - reviewed scan with chest radiology, RLL segmental clot present as well  -Likely 2nd to recent hospitalization and recent COVID infection   -LE duplex with superficial clot   -TTE with no evidence of R heart strain   -Pt seems high risk for full dose AC given hx of recent falls and unsteadiness from Parkinson's, but given clot in segmental artery as well as extending into subsegmental artery, suggest full dose AC for at least 3 months.  -Prior to March, pt with no hx of falls as per wife (discussed over phone)  -Continue to monitor o2 sats on RA at rest and with exertion  -wean o2 as tolerated, keep sats >88% with supplemental O2 PRN

## 2021-05-05 VITALS
DIASTOLIC BLOOD PRESSURE: 79 MMHG | HEART RATE: 92 BPM | TEMPERATURE: 97 F | OXYGEN SATURATION: 100 % | SYSTOLIC BLOOD PRESSURE: 133 MMHG | RESPIRATION RATE: 18 BRPM

## 2021-05-05 LAB
ALBUMIN SERPL ELPH-MCNC: 3.7 G/DL — SIGNIFICANT CHANGE UP (ref 3.3–5)
ALP SERPL-CCNC: 62 U/L — SIGNIFICANT CHANGE UP (ref 40–120)
ALT FLD-CCNC: 8 U/L — LOW (ref 10–45)
ANION GAP SERPL CALC-SCNC: 15 MMOL/L — SIGNIFICANT CHANGE UP (ref 5–17)
AST SERPL-CCNC: 8 U/L — LOW (ref 10–40)
BILIRUB SERPL-MCNC: 0.2 MG/DL — SIGNIFICANT CHANGE UP (ref 0.2–1.2)
BUN SERPL-MCNC: 14 MG/DL — SIGNIFICANT CHANGE UP (ref 7–23)
CALCIUM SERPL-MCNC: 9.4 MG/DL — SIGNIFICANT CHANGE UP (ref 8.4–10.5)
CHLORIDE SERPL-SCNC: 100 MMOL/L — SIGNIFICANT CHANGE UP (ref 96–108)
CO2 SERPL-SCNC: 24 MMOL/L — SIGNIFICANT CHANGE UP (ref 22–31)
CREAT SERPL-MCNC: 0.55 MG/DL — SIGNIFICANT CHANGE UP (ref 0.5–1.3)
GLUCOSE BLDC GLUCOMTR-MCNC: 174 MG/DL — HIGH (ref 70–99)
GLUCOSE BLDC GLUCOMTR-MCNC: 279 MG/DL — HIGH (ref 70–99)
GLUCOSE SERPL-MCNC: 206 MG/DL — HIGH (ref 70–99)
HCT VFR BLD CALC: 35.5 % — LOW (ref 39–50)
HGB BLD-MCNC: 11.3 G/DL — LOW (ref 13–17)
MCHC RBC-ENTMCNC: 29.6 PG — SIGNIFICANT CHANGE UP (ref 27–34)
MCHC RBC-ENTMCNC: 31.8 GM/DL — LOW (ref 32–36)
MCV RBC AUTO: 92.9 FL — SIGNIFICANT CHANGE UP (ref 80–100)
NRBC # BLD: 0 /100 WBCS — SIGNIFICANT CHANGE UP (ref 0–0)
PLATELET # BLD AUTO: 399 K/UL — SIGNIFICANT CHANGE UP (ref 150–400)
POTASSIUM SERPL-MCNC: 5 MMOL/L — SIGNIFICANT CHANGE UP (ref 3.5–5.3)
POTASSIUM SERPL-SCNC: 5 MMOL/L — SIGNIFICANT CHANGE UP (ref 3.5–5.3)
PROT SERPL-MCNC: 7.4 G/DL — SIGNIFICANT CHANGE UP (ref 6–8.3)
RBC # BLD: 3.82 M/UL — LOW (ref 4.2–5.8)
RBC # FLD: 15.3 % — HIGH (ref 10.3–14.5)
SODIUM SERPL-SCNC: 139 MMOL/L — SIGNIFICANT CHANGE UP (ref 135–145)
WBC # BLD: 10.48 K/UL — SIGNIFICANT CHANGE UP (ref 3.8–10.5)
WBC # FLD AUTO: 10.48 K/UL — SIGNIFICANT CHANGE UP (ref 3.8–10.5)

## 2021-05-05 PROCEDURE — 86803 HEPATITIS C AB TEST: CPT

## 2021-05-05 PROCEDURE — 84439 ASSAY OF FREE THYROXINE: CPT

## 2021-05-05 PROCEDURE — 86140 C-REACTIVE PROTEIN: CPT

## 2021-05-05 PROCEDURE — 84145 PROCALCITONIN (PCT): CPT

## 2021-05-05 PROCEDURE — 93005 ELECTROCARDIOGRAM TRACING: CPT

## 2021-05-05 PROCEDURE — 84484 ASSAY OF TROPONIN QUANT: CPT

## 2021-05-05 PROCEDURE — 82746 ASSAY OF FOLIC ACID SERUM: CPT

## 2021-05-05 PROCEDURE — U0003: CPT

## 2021-05-05 PROCEDURE — 84100 ASSAY OF PHOSPHORUS: CPT

## 2021-05-05 PROCEDURE — 83036 HEMOGLOBIN GLYCOSYLATED A1C: CPT

## 2021-05-05 PROCEDURE — 83880 ASSAY OF NATRIURETIC PEPTIDE: CPT

## 2021-05-05 PROCEDURE — 80053 COMPREHEN METABOLIC PANEL: CPT

## 2021-05-05 PROCEDURE — U0005: CPT

## 2021-05-05 PROCEDURE — 97116 GAIT TRAINING THERAPY: CPT

## 2021-05-05 PROCEDURE — 85379 FIBRIN DEGRADATION QUANT: CPT

## 2021-05-05 PROCEDURE — 97110 THERAPEUTIC EXERCISES: CPT

## 2021-05-05 PROCEDURE — 85025 COMPLETE CBC W/AUTO DIFF WBC: CPT

## 2021-05-05 PROCEDURE — 85610 PROTHROMBIN TIME: CPT

## 2021-05-05 PROCEDURE — 83540 ASSAY OF IRON: CPT

## 2021-05-05 PROCEDURE — 86769 SARS-COV-2 COVID-19 ANTIBODY: CPT

## 2021-05-05 PROCEDURE — 99285 EMERGENCY DEPT VISIT HI MDM: CPT | Mod: 25

## 2021-05-05 PROCEDURE — 96375 TX/PRO/DX INJ NEW DRUG ADDON: CPT

## 2021-05-05 PROCEDURE — 82607 VITAMIN B-12: CPT

## 2021-05-05 PROCEDURE — 85730 THROMBOPLASTIN TIME PARTIAL: CPT

## 2021-05-05 PROCEDURE — 80048 BASIC METABOLIC PNL TOTAL CA: CPT

## 2021-05-05 PROCEDURE — 82962 GLUCOSE BLOOD TEST: CPT

## 2021-05-05 PROCEDURE — 84443 ASSAY THYROID STIM HORMONE: CPT

## 2021-05-05 PROCEDURE — 83735 ASSAY OF MAGNESIUM: CPT

## 2021-05-05 PROCEDURE — 71045 X-RAY EXAM CHEST 1 VIEW: CPT

## 2021-05-05 PROCEDURE — 94640 AIRWAY INHALATION TREATMENT: CPT

## 2021-05-05 PROCEDURE — 83550 IRON BINDING TEST: CPT

## 2021-05-05 PROCEDURE — 96374 THER/PROPH/DIAG INJ IV PUSH: CPT

## 2021-05-05 PROCEDURE — 71275 CT ANGIOGRAPHY CHEST: CPT

## 2021-05-05 PROCEDURE — 97112 NEUROMUSCULAR REEDUCATION: CPT

## 2021-05-05 PROCEDURE — 82728 ASSAY OF FERRITIN: CPT

## 2021-05-05 PROCEDURE — 80307 DRUG TEST PRSMV CHEM ANLYZR: CPT

## 2021-05-05 PROCEDURE — 93970 EXTREMITY STUDY: CPT

## 2021-05-05 PROCEDURE — 97162 PT EVAL MOD COMPLEX 30 MIN: CPT

## 2021-05-05 PROCEDURE — C8929: CPT

## 2021-05-05 PROCEDURE — 85027 COMPLETE CBC AUTOMATED: CPT

## 2021-05-05 RX ORDER — ALBUTEROL 90 UG/1
2 AEROSOL, METERED ORAL
Qty: 1 | Refills: 0
Start: 2021-05-05

## 2021-05-05 RX ORDER — BUDESONIDE AND FORMOTEROL FUMARATE DIHYDRATE 160; 4.5 UG/1; UG/1
2 AEROSOL RESPIRATORY (INHALATION)
Qty: 1 | Refills: 0
Start: 2021-05-05 | End: 2021-06-03

## 2021-05-05 RX ORDER — TIOTROPIUM BROMIDE 18 UG/1
1 CAPSULE ORAL; RESPIRATORY (INHALATION)
Qty: 30 | Refills: 0
Start: 2021-05-05 | End: 2021-06-03

## 2021-05-05 RX ORDER — TIOTROPIUM BROMIDE 18 UG/1
1 CAPSULE ORAL; RESPIRATORY (INHALATION) DAILY
Refills: 0 | Status: DISCONTINUED | OUTPATIENT
Start: 2021-05-05 | End: 2021-05-05

## 2021-05-05 RX ORDER — APIXABAN 2.5 MG/1
1 TABLET, FILM COATED ORAL
Qty: 60 | Refills: 0
Start: 2021-05-05 | End: 2021-06-03

## 2021-05-05 RX ORDER — BUDESONIDE AND FORMOTEROL FUMARATE DIHYDRATE 160; 4.5 UG/1; UG/1
2 AEROSOL RESPIRATORY (INHALATION)
Refills: 0 | Status: DISCONTINUED | OUTPATIENT
Start: 2021-05-05 | End: 2021-05-05

## 2021-05-05 RX ORDER — IPRATROPIUM/ALBUTEROL SULFATE 18-103MCG
3 AEROSOL WITH ADAPTER (GRAM) INHALATION
Qty: 0 | Refills: 0 | DISCHARGE

## 2021-05-05 RX ORDER — TIOTROPIUM BROMIDE AND OLODATEROL 3.124; 2.736 UG/1; UG/1
2 SPRAY, METERED RESPIRATORY (INHALATION)
Qty: 1 | Refills: 0
Start: 2021-05-05 | End: 2021-06-03

## 2021-05-05 RX ORDER — ALBUTEROL 90 UG/1
2 AEROSOL, METERED ORAL EVERY 6 HOURS
Refills: 0 | Status: DISCONTINUED | OUTPATIENT
Start: 2021-05-05 | End: 2021-05-05

## 2021-05-05 RX ADMIN — ALBUTEROL 2 PUFF(S): 90 AEROSOL, METERED ORAL at 06:19

## 2021-05-05 RX ADMIN — GABAPENTIN 400 MILLIGRAM(S): 400 CAPSULE ORAL at 06:18

## 2021-05-05 RX ADMIN — Medication 3: at 12:28

## 2021-05-05 RX ADMIN — DIVALPROEX SODIUM 250 MILLIGRAM(S): 500 TABLET, DELAYED RELEASE ORAL at 06:17

## 2021-05-05 RX ADMIN — TIOTROPIUM BROMIDE 1 CAPSULE(S): 18 CAPSULE ORAL; RESPIRATORY (INHALATION) at 12:28

## 2021-05-05 RX ADMIN — ALBUTEROL 2 PUFF(S): 90 AEROSOL, METERED ORAL at 00:00

## 2021-05-05 RX ADMIN — SERTRALINE 100 MILLIGRAM(S): 25 TABLET, FILM COATED ORAL at 12:28

## 2021-05-05 RX ADMIN — Medication 100 MICROGRAM(S): at 06:18

## 2021-05-05 RX ADMIN — ENOXAPARIN SODIUM 100 MILLIGRAM(S): 100 INJECTION SUBCUTANEOUS at 06:17

## 2021-05-05 RX ADMIN — Medication 1: at 08:28

## 2021-05-05 RX ADMIN — OLANZAPINE 5 MILLIGRAM(S): 15 TABLET, FILM COATED ORAL at 06:19

## 2021-05-05 RX ADMIN — Medication 40 MILLIGRAM(S): at 06:18

## 2021-05-05 NOTE — PROGRESS NOTE ADULT - PROBLEM SELECTOR PLAN 1
CTA chest read with R subsegmental PE - reviewed scan with chest radiology, RLL segmental clot present as well  -Likely 2nd to recent hospitalization and recent COVID infection   -LE duplex with superficial clot   -TTE with no evidence of R heart strain   -Pt seems high risk for full dose AC given hx of recent falls and unsteadiness from Parkinson's, but given clot in segmental artery as well as extending into subsegmental artery, suggest full dose AC for at least 3 months.  -Lovenox transitioned to Eliquis 10mg PO BID x 7 days, 5mg PO BID x 3 months.  -Prior to March, pt with no hx of falls as per wife (discussed over phone)  -Continue to monitor o2 sats on RA at rest and with exertion  -wean o2 as tolerated, keep sats >88% with supplemental O2 PRN

## 2021-05-05 NOTE — PROGRESS NOTE ADULT - PROBLEM SELECTOR PLAN 5
-reportedly last used about 2 months ago  -incorrect tox screen ordered yesterday, correct one ordered now  -Per primary team.
-reportedly last used about 2 months ago  -incorrect tox screen ordered yesterday, correct one ordered now  -Per primary team.

## 2021-05-05 NOTE — PROGRESS NOTE ADULT - SUBJECTIVE AND OBJECTIVE BOX
DATE OF SERVICE: 05-05-21 @ 07:09    Subjective: Patient seen and examined. No new events except as noted.     SUBJECTIVE/ROS:        MEDICATIONS:  MEDICATIONS  (STANDING):  ALBUTerol    90 MICROgram(s) HFA Inhaler 2 Puff(s) Inhalation every 6 hours  dextrose 40% Gel 15 Gram(s) Oral once  dextrose 5%. 1000 milliLiter(s) (50 mL/Hr) IV Continuous <Continuous>  dextrose 5%. 1000 milliLiter(s) (100 mL/Hr) IV Continuous <Continuous>  dextrose 50% Injectable 25 Gram(s) IV Push once  dextrose 50% Injectable 12.5 Gram(s) IV Push once  dextrose 50% Injectable 25 Gram(s) IV Push once  diVALproex  milliGRAM(s) Oral three times a day  enoxaparin Injectable 100 milliGRAM(s) SubCutaneous two times a day  gabapentin 400 milliGRAM(s) Oral two times a day  glucagon  Injectable 1 milliGRAM(s) IntraMuscular once  insulin lispro (ADMELOG) corrective regimen sliding scale   SubCutaneous three times a day before meals  insulin lispro (ADMELOG) corrective regimen sliding scale   SubCutaneous at bedtime  levothyroxine 100 MICROGram(s) Oral daily  melatonin 10 milliGRAM(s) Oral at bedtime  mirtazapine 7.5 milliGRAM(s) Oral at bedtime  OLANZapine 5 milliGRAM(s) Oral two times a day  predniSONE   Tablet 40 milliGRAM(s) Oral daily  sertraline 100 milliGRAM(s) Oral daily  simvastatin 40 milliGRAM(s) Oral at bedtime  tiotropium 2.5 MICROgram(s)/olodaterol 2.5 MICROgram(s) (STIOLTO) Inhaler 2 Puff(s) Inhalation daily      PHYSICAL EXAM:  T(C): 35.8 (05-05-21 @ 05:28), Max: 36.5 (05-04-21 @ 16:11)  HR: 60 (05-05-21 @ 05:28) (60 - 75)  BP: 133/91 (05-05-21 @ 05:28) (124/78 - 167/82)  RR: 19 (05-05-21 @ 05:28) (18 - 21)  SpO2: 95% (05-05-21 @ 05:28) (92% - 100%)  Wt(kg): --  I&O's Summary    04 May 2021 07:01  -  05 May 2021 07:00  --------------------------------------------------------  IN: 400 mL / OUT: 1100 mL / NET: -700 mL            LABS/DATA:    CARDIAC MARKERS:                                11.3   10.48 )-----------( 399      ( 05 May 2021 06:27 )             35.5     05-04    142  |  100  |  16  ----------------------------<  145<H>  3.9   |  27  |  0.64    Ca    9.0      04 May 2021 05:30  Phos  4.4     05-04  Mg     1.4     05-04    TPro  6.7  /  Alb  3.4  /  TBili  0.2  /  DBili  x   /  AST  9<L>  /  ALT  7<L>  /  AlkPhos  57  05-04    proBNP:   Lipid Profile:   HgA1c:   TSH:     TELE:  EKG:

## 2021-05-05 NOTE — CHART NOTE - NSCHARTNOTEFT_GEN_A_CORE
Pt medically cleared for discharge by pulmonology and Dr. Dougherty. Pt ambulating without oxygen needs. Noted hx of falls, DIOGENES recommended. Pt adamantly refusing DIOGENES at this time. States he "always has help at home". Medications reviewed with pulmonology including steroid taper and Eliquis. Pt to start with 10mg tonight BID, for 7 days, then 5mg BID after that. Educated on importance of medication compliance. Pt to follow up with pulm, psyc, PMD. Discharge home with home PT

## 2021-05-05 NOTE — PROGRESS NOTE ADULT - PROBLEM SELECTOR PLAN 6
- reported bradycardia while sleeping, admits to excessive snoring  - suggest outpt PSG
- reported bradycardia while sleeping, admits to excessive snoring  - suggest outpt PSG

## 2021-05-05 NOTE — PROGRESS NOTE ADULT - PROBLEM SELECTOR PLAN 4
by hx - about 3-4 weeks ago  -No significant GGO on CTA  -No indication for Remdesivir or Decadron at this time   -ID f/u.
by hx - about 3-4 weeks ago  -No significant GGO on CTA  -No indication for Remdesivir or Decadron at this time   -ID f/u.

## 2021-05-05 NOTE — PROGRESS NOTE ADULT - PROBLEM SELECTOR PLAN 2
-Given location of PE, doubt PE is cause of SOB. Suspect underlying lung disease +/- drug use playing a role in SOB.  -Wrong tox screen sent yesterday, correct test ordered now
-Given location of PE, doubt PE is cause of SOB. Suspect underlying lung disease playing a role in SOB.  -Tox screen neg

## 2021-05-05 NOTE — PROGRESS NOTE ADULT - PROBLEM SELECTOR PLAN 3
~48 pack year hx, diagnosed with COPD as an outpatient by his PCP   -Quit smoking about 2 months ago  -Non compliant with inhalers at home  -Suspect underlying lung disease playing a role in mild hypoxia with exertion. S/p prednisone 40mg PO started yesterday evening (received 2 doses) - hypoxia with exertion now resolved.   -Pred 30mg PO qd x 3 days then stop  -D/c Stiolto, start Symbicort 160mcg/4.5mcg 2 puffs BID and Spiriva 18 mcg 1 capsule inhaled qd  -Albuterol 2 puffs q6h PRN  -F/u with outpatient pulm Dr. Augustine Tapia on discharge.

## 2021-05-05 NOTE — PROGRESS NOTE ADULT - SUBJECTIVE AND OBJECTIVE BOX
DATE OF SERVICE: 05-05-21 @ 16:56    Patient is a 65y old  Male who presents with a chief complaint of SOB (05 May 2021 11:24)      SUBJECTIVE / OVERNIGHT EVENTS:  No chest pain. No shortness of breath. No complaints. No events overnight.     MEDICATIONS  (STANDING):  budesonide 160 MICROgram(s)/formoterol 4.5 MICROgram(s) Inhaler 2 Puff(s) Inhalation two times a day  dextrose 40% Gel 15 Gram(s) Oral once  dextrose 5%. 1000 milliLiter(s) (50 mL/Hr) IV Continuous <Continuous>  dextrose 5%. 1000 milliLiter(s) (100 mL/Hr) IV Continuous <Continuous>  dextrose 50% Injectable 25 Gram(s) IV Push once  dextrose 50% Injectable 12.5 Gram(s) IV Push once  dextrose 50% Injectable 25 Gram(s) IV Push once  diVALproex  milliGRAM(s) Oral three times a day  enoxaparin Injectable 100 milliGRAM(s) SubCutaneous two times a day  gabapentin 400 milliGRAM(s) Oral two times a day  glucagon  Injectable 1 milliGRAM(s) IntraMuscular once  insulin lispro (ADMELOG) corrective regimen sliding scale   SubCutaneous three times a day before meals  insulin lispro (ADMELOG) corrective regimen sliding scale   SubCutaneous at bedtime  levothyroxine 100 MICROGram(s) Oral daily  melatonin 10 milliGRAM(s) Oral at bedtime  mirtazapine 7.5 milliGRAM(s) Oral at bedtime  OLANZapine 5 milliGRAM(s) Oral two times a day  predniSONE   Tablet 40 milliGRAM(s) Oral daily  sertraline 100 milliGRAM(s) Oral daily  simvastatin 40 milliGRAM(s) Oral at bedtime  tiotropium 18 MICROgram(s) Capsule 1 Capsule(s) Inhalation daily    MEDICATIONS  (PRN):  ALBUTerol    90 MICROgram(s) HFA Inhaler 2 Puff(s) Inhalation every 6 hours PRN Shortness of Breath and/or Wheezing      Vital Signs Last 24 Hrs  T(C): 36.1 (05 May 2021 13:18), Max: 36.4 (04 May 2021 17:44)  T(F): 97 (05 May 2021 13:18), Max: 97.6 (04 May 2021 21:20)  HR: 92 (05 May 2021 13:18) (60 - 92)  BP: 133/79 (05 May 2021 13:18) (133/79 - 167/82)  BP(mean): --  RR: 18 (05 May 2021 13:18) (18 - 21)  SpO2: 100% (05 May 2021 13:18) (93% - 100%)  CAPILLARY BLOOD GLUCOSE      POCT Blood Glucose.: 279 mg/dL (05 May 2021 12:05)  POCT Blood Glucose.: 174 mg/dL (05 May 2021 08:16)  POCT Blood Glucose.: 232 mg/dL (04 May 2021 21:19)  POCT Blood Glucose.: 151 mg/dL (04 May 2021 17:09)    I&O's Summary    04 May 2021 07:01  -  05 May 2021 07:00  --------------------------------------------------------  IN: 400 mL / OUT: 1100 mL / NET: -700 mL    05 May 2021 07:01  -  05 May 2021 16:56  --------------------------------------------------------  IN: 250 mL / OUT: 200 mL / NET: 50 mL        PHYSICAL EXAM:  GENERAL: NAD, well-developed  HEAD:  Atraumatic, Normocephalic  EYES: EOMI, PERRLA, conjunctiva and sclera clear  NECK: Supple, No JVD  CHEST/LUNG: Clear to auscultation bilaterally; No wheeze  HEART: Regular rate and rhythm; No murmurs, rubs, or gallops  ABDOMEN: Soft, Nontender, Nondistended; Bowel sounds present  EXTREMITIES:  2+ Peripheral Pulses, No clubbing, cyanosis, or edema  PSYCH: AAOx3  NEUROLOGY: non-focal  SKIN: No rashes or lesions    LABS:                        11.3   10.48 )-----------( 399      ( 05 May 2021 06:27 )             35.5     05-05    139  |  100  |  14  ----------------------------<  206<H>  5.0   |  24  |  0.55    Ca    9.4      05 May 2021 06:27  Phos  4.4     05-04  Mg     1.4     05-04    TPro  7.4  /  Alb  3.7  /  TBili  0.2  /  DBili  x   /  AST  8<L>  /  ALT  8<L>  /  AlkPhos  62  05-05              RADIOLOGY & ADDITIONAL TESTS:    Imaging Personally Reviewed:    Consultant(s) Notes Reviewed:      Care Discussed with Consultants/Other Providers:

## 2021-05-05 NOTE — PROGRESS NOTE ADULT - SUBJECTIVE AND OBJECTIVE BOX
Follow-up Pulm Progress Note    Feeling better  Sats now 100% on RA at rest and with exertion    Medications:  MEDICATIONS  (STANDING):  ALBUTerol    90 MICROgram(s) HFA Inhaler 2 Puff(s) Inhalation every 6 hours  dextrose 40% Gel 15 Gram(s) Oral once  dextrose 5%. 1000 milliLiter(s) (50 mL/Hr) IV Continuous <Continuous>  dextrose 5%. 1000 milliLiter(s) (100 mL/Hr) IV Continuous <Continuous>  dextrose 50% Injectable 25 Gram(s) IV Push once  dextrose 50% Injectable 12.5 Gram(s) IV Push once  dextrose 50% Injectable 25 Gram(s) IV Push once  diVALproex  milliGRAM(s) Oral three times a day  enoxaparin Injectable 100 milliGRAM(s) SubCutaneous two times a day  gabapentin 400 milliGRAM(s) Oral two times a day  glucagon  Injectable 1 milliGRAM(s) IntraMuscular once  insulin lispro (ADMELOG) corrective regimen sliding scale   SubCutaneous three times a day before meals  insulin lispro (ADMELOG) corrective regimen sliding scale   SubCutaneous at bedtime  levothyroxine 100 MICROGram(s) Oral daily  melatonin 10 milliGRAM(s) Oral at bedtime  mirtazapine 7.5 milliGRAM(s) Oral at bedtime  OLANZapine 5 milliGRAM(s) Oral two times a day  predniSONE   Tablet 40 milliGRAM(s) Oral daily  sertraline 100 milliGRAM(s) Oral daily  simvastatin 40 milliGRAM(s) Oral at bedtime  tiotropium 2.5 MICROgram(s)/olodaterol 2.5 MICROgram(s) (STIOLTO) Inhaler 2 Puff(s) Inhalation daily      Vital Signs Last 24 Hrs  T(C): 35.8 (05 May 2021 05:28), Max: 36.5 (04 May 2021 16:11)  T(F): 96.5 (05 May 2021 05:28), Max: 97.7 (04 May 2021 16:11)  HR: 63 (05 May 2021 10:13) (60 - 75)  BP: 133/91 (05 May 2021 05:28) (133/91 - 167/82)  BP(mean): --  RR: 18 (05 May 2021 10:13) (18 - 21)  SpO2: 100% (05 May 2021 10:13) (92% - 100%)          05-04 @ 07:01  -  05-05 @ 07:00  --------------------------------------------------------  IN: 400 mL / OUT: 1100 mL / NET: -700 mL          LABS:                        11.3   10.48 )-----------( 399      ( 05 May 2021 06:27 )             35.5     05-05    139  |  100  |  14  ----------------------------<  206<H>  5.0   |  24  |  0.55    Ca    9.4      05 May 2021 06:27  Phos  4.4     05-04  Mg     1.4     05-04    TPro  7.4  /  Alb  3.7  /  TBili  0.2  /  DBili  x   /  AST  8<L>  /  ALT  8<L>  /  AlkPhos  62  05-05          CAPILLARY BLOOD GLUCOSE      POCT Blood Glucose.: 174 mg/dL (05 May 2021 08:16)        Procalcitonin, Serum: 0.05 ng/mL (05-03-21 @ 06:09)        Physical Examination:  PULM: Clear to auscultation bilaterally  CVS: S1, S2 heard    RADIOLOGY REVIEWED  CT chest: 5/2  FINDINGS:  LUNGS AND AIRWAYS: Patent central airways. There is mild linear atelectasis in the lower lobes and lingula.  PLEURA: No pleural effusion.  MEDIASTINUM AND ROB: Mediastinal and hilar lymphadenopathy which is a change from the prior study. Right paratracheal lymph node measures up to 1.0 cm in short axis. Subcarinal lymph node measures 1.7 cm..  VESSELS: Subsegmental pulmonary embolism. Right lower lobe best seen on image 285 of series 3. Coronary artery calcifications.  HEART: Heart size is normal. No pericardial effusion. Trace fluid within the superior aortic recess.  CHEST WALL AND LOWER NECK: Within normal limits.  VISUALIZED UPPER ABDOMEN: Within normal limits.  BONES: Healed fracture deformities of the right lateral eighth and ninth ribs. There is also a healed right fifth lateral rib fracture The previously noted sclerotic focus within the anterolateral right eighth rib is not visualized.    IMPRESSION:  Subsegmental pulmonary embolism to the right lower lobe.  Mediastinal and hilar adenopathy new from the prior study which is nonspecific.  Healed fracture deformities of the right lateral eighth and ninth ribs. Healed rib fracture of the fifth lateral right rib. The previously noted sclerotic focus within the anterolateral right eighth rib is not visualized and likely represented healing rib fracture.   Follow-up Pulm Progress Note    Feeling better, less dyspneic with exertion   Sats now 100% on RA at rest and with exertion    Medications:  MEDICATIONS  (STANDING):  ALBUTerol    90 MICROgram(s) HFA Inhaler 2 Puff(s) Inhalation every 6 hours  dextrose 40% Gel 15 Gram(s) Oral once  dextrose 5%. 1000 milliLiter(s) (50 mL/Hr) IV Continuous <Continuous>  dextrose 5%. 1000 milliLiter(s) (100 mL/Hr) IV Continuous <Continuous>  dextrose 50% Injectable 25 Gram(s) IV Push once  dextrose 50% Injectable 12.5 Gram(s) IV Push once  dextrose 50% Injectable 25 Gram(s) IV Push once  diVALproex  milliGRAM(s) Oral three times a day  enoxaparin Injectable 100 milliGRAM(s) SubCutaneous two times a day  gabapentin 400 milliGRAM(s) Oral two times a day  glucagon  Injectable 1 milliGRAM(s) IntraMuscular once  insulin lispro (ADMELOG) corrective regimen sliding scale   SubCutaneous three times a day before meals  insulin lispro (ADMELOG) corrective regimen sliding scale   SubCutaneous at bedtime  levothyroxine 100 MICROGram(s) Oral daily  melatonin 10 milliGRAM(s) Oral at bedtime  mirtazapine 7.5 milliGRAM(s) Oral at bedtime  OLANZapine 5 milliGRAM(s) Oral two times a day  predniSONE   Tablet 40 milliGRAM(s) Oral daily  sertraline 100 milliGRAM(s) Oral daily  simvastatin 40 milliGRAM(s) Oral at bedtime  tiotropium 2.5 MICROgram(s)/olodaterol 2.5 MICROgram(s) (STIOLTO) Inhaler 2 Puff(s) Inhalation daily      Vital Signs Last 24 Hrs  T(C): 35.8 (05 May 2021 05:28), Max: 36.5 (04 May 2021 16:11)  T(F): 96.5 (05 May 2021 05:28), Max: 97.7 (04 May 2021 16:11)  HR: 63 (05 May 2021 10:13) (60 - 75)  BP: 133/91 (05 May 2021 05:28) (133/91 - 167/82)  BP(mean): --  RR: 18 (05 May 2021 10:13) (18 - 21)  SpO2: 100% (05 May 2021 10:13) (92% - 100%)          05-04 @ 07:01  -  05-05 @ 07:00  --------------------------------------------------------  IN: 400 mL / OUT: 1100 mL / NET: -700 mL          LABS:                        11.3   10.48 )-----------( 399      ( 05 May 2021 06:27 )             35.5     05-05    139  |  100  |  14  ----------------------------<  206<H>  5.0   |  24  |  0.55    Ca    9.4      05 May 2021 06:27  Phos  4.4     05-04  Mg     1.4     05-04    TPro  7.4  /  Alb  3.7  /  TBili  0.2  /  DBili  x   /  AST  8<L>  /  ALT  8<L>  /  AlkPhos  62  05-05          CAPILLARY BLOOD GLUCOSE      POCT Blood Glucose.: 174 mg/dL (05 May 2021 08:16)        Procalcitonin, Serum: 0.05 ng/mL (05-03-21 @ 06:09)        Physical Examination:  PULM: Clear to auscultation bilaterally  CVS: S1, S2 heard    RADIOLOGY REVIEWED  CT chest: 5/2  FINDINGS:  LUNGS AND AIRWAYS: Patent central airways. There is mild linear atelectasis in the lower lobes and lingula.  PLEURA: No pleural effusion.  MEDIASTINUM AND ROB: Mediastinal and hilar lymphadenopathy which is a change from the prior study. Right paratracheal lymph node measures up to 1.0 cm in short axis. Subcarinal lymph node measures 1.7 cm..  VESSELS: Subsegmental pulmonary embolism. Right lower lobe best seen on image 285 of series 3. Coronary artery calcifications.  HEART: Heart size is normal. No pericardial effusion. Trace fluid within the superior aortic recess.  CHEST WALL AND LOWER NECK: Within normal limits.  VISUALIZED UPPER ABDOMEN: Within normal limits.  BONES: Healed fracture deformities of the right lateral eighth and ninth ribs. There is also a healed right fifth lateral rib fracture The previously noted sclerotic focus within the anterolateral right eighth rib is not visualized.    IMPRESSION:  Subsegmental pulmonary embolism to the right lower lobe.  Mediastinal and hilar adenopathy new from the prior study which is nonspecific.  Healed fracture deformities of the right lateral eighth and ninth ribs. Healed rib fracture of the fifth lateral right rib. The previously noted sclerotic focus within the anterolateral right eighth rib is not visualized and likely represented healing rib fracture.

## 2021-05-05 NOTE — PROGRESS NOTE ADULT - ASSESSMENT
66 y/o M with PMH Parkinson's, COPD, DM, covid in past unsure of exact dates reportedly 3-4 weeks ago while in rehab), recent falls, also recently intubated and extubated at [facility] (3/17 - 4/4 or 4/5) reportedly for CHF exacerbation. D/c'd to rehab. Now presents to ED with SOB, diaphoresis, night sweats, increase in leg size. CTA chest read with R subsegmental PE - reviewed scan with chest radiology, RLL segmental clot present as well. 
66yo m pmhx parkinson's, copd, dm, covid in past unsure of exact dates, also recently intubated and extubated early april (pt says not from covid) sent to rehab where he ama'd yest because he had a  to go to is pw ems for shortness of breath. wife at bedside reports he has been having episodes where he becomes very pale and diaphoretic. he is also having night sweats. also reports having mild increase in leg size and is visibly shortness of breath tonight. Denies recent trauma, fevers, headache, dizziness, nausea, vomiting, dysuria, freq, hematuria, diarrhea, constipation, chest pain, cough.    subsegmental Pulmonary embolism.   - CTA chest read with R subsegmental PE -  RLL segmental clot present as well  -Likely 2nd to recent hospitalization and recent COVID infection   -LE duplex with superficial clot   -TTE with no evidence of R heart strain   -Pt seems high risk for full dose AC given hx of recent falls and unsteadiness from Parkinson's, but given clot in segmental artery as well as extending into subsegmental artery, favor full dose AC for at least 3 months.  -Prior to March, pt with no hx of falls as per wife (discussed over phone)  -Check O2 sats on RA at rest and with exertion, keep sats >88% with supplemental O2 PRN    SOB (shortness of breath).    -Given location of PE, doubt PE is cause of SOB. Suspect underlying lung disease +/- drug use playing a role in SOB.  -Check urine tox screen.    COPD (chronic obstructive pulmonary disease).   ~48 pack year hx, diagnosed with COPD as an outpatient by his PCP   -Quit smoking about 2 months ago  -Non compliant with inhalers at home  -Start Stiolto 2 puffs qd (use with spacer)  -Start albuterol 2 puffs q6h PRN (use with spacer).    COVID-19.    by hx - about 3-4 weeks ago  -No significant GGO on CTA  -No indication for Remdesivir or Decadron at this time   -ID f/u.    tob use d/o  - nicotine patch  - tob cessation counselling    anemia  - check iron studies    parkinson's disease  - cont home meds    h/o drug abuse  - psych eval    
Sinus bradycardia  episodic   P-P prolongation   happens during sleep   appears to be vagally mediated likely from apneic episodes  highly suspect underlying CODIE  recommend to have sleep study as outpt   fu with pulm   avoid avn blockers    atach with aberrant conduction   due to PE   avoid avn blockers given bradycardia    PE   on a/c  no evidence of right heart strain     
64yo m pmhx parkinson's, copd, dm, covid in past unsure of exact dates, also recently intubated and extubated early april (pt says not from covid) sent to rehab where he ama'd yest because he had a  to go to is pw ems for shortness of breath. wife at bedside reports he has been having episodes where he becomes very pale and diaphoretic. he is also having night sweats. also reports having mild increase in leg size and is visibly shortness of breath tonight. Denies recent trauma, fevers, headache, dizziness, nausea, vomiting, dysuria, freq, hematuria, diarrhea, constipation, chest pain, cough.    Pulmonary embolism.   - CTA chest read with R subsegmental PE - reviewed scan with chest radiology, RLL segmental clot present as well  -Likely 2nd to recent hospitalization and recent COVID infection   -LE duplex with superficial clot   -TTE with no evidence of R heart strain   -Pt seems high risk for full dose AC given hx of recent falls and unsteadiness from Parkinson's, but given clot in segmental artery as well as extending into subsegmental artery, suggest full dose AC for at least 3 months.  -Prior to March, pt with no hx of falls as per wife   -Continue to monitor o2 sats on RA at rest and with exertion  -wean o2 as tolerated, keep sats >88% with supplemental O2 PRN.    SOB (shortness of breath).    - -Given location of PE, doubt PE is cause of SOB. Suspect underlying lung disease +/- drug use playing a role in SOB.  -Wrong tox screen sent yesterday, correct test ordered now.     COPD (chronic obstructive pulmonary disease).   - ~48 pack year hx, diagnosed with COPD as an outpatient by his PCP   -Quit smoking about 2 months ago  -Non compliant with inhalers at home  -Stiolto 2 puffs qd (use with spacer)  -Change albuterol from PRN to standing q6 hours  -Suspect underlying lung disease playing a role in mild hypoxia. Start prednisone 40mg PO qd and observe.     COVID-19.    -  by hx - about 3-4 weeks ago  -No significant GGO on CTA  -No indication for Remdesivir or Decadron at this time   -ID f/u.     tob use d/o  - nicotine patch  - tob cessation counselling    anemia  - check iron studies    parkinson's disease  - cont home meds    h/o drug abuse  - psych eval    
66yo m pmhx parkinson's, copd, dm, covid in past unsure of exact dates, also recently intubated and extubated early april (pt says not from covid) sent to rehab where he ama'd yest because he had a  to go to is pw ems for shortness of breath. wife at bedside reports he has been having episodes where he becomes very pale and diaphoretic. he is also having night sweats. also reports having mild increase in leg size and is visibly shortness of breath tonight. Denies recent trauma, fevers, headache, dizziness, nausea, vomiting, dysuria, freq, hematuria, diarrhea, constipation, chest pain, cough.    Pulmonary embolism.  - CTA chest read with R subsegmental PE - reviewed scan with chest radiology, RLL segmental clot present as well  -Likely 2nd to recent hospitalization and recent COVID infection   -LE duplex with superficial clot   -TTE with no evidence of R heart strain   -Pt seems high risk for full dose AC given hx of recent falls and unsteadiness from Parkinson's, but given clot in segmental artery as well as extending into subsegmental artery, suggest full dose AC for at least 3 months.  -Lovenox transitioned to Eliquis 10mg PO BID x 7 days, 5mg PO BID x 3 months.  -Prior to March, pt with no hx of falls as per wife (discussed over phone)  -Continue to monitor o2 sats on RA at rest and with exertion  -wean o2 as tolerated, keep sats >88% with supplemental O2 PRN.      SOB (shortness of breath).    -Given location of PE, doubt PE is cause of SOB. Suspect underlying lung disease playing a role in SOB.  -Tox screen neg.     COPD (chronic obstructive pulmonary disease).    ~48 pack year hx, diagnosed with COPD as an outpatient by his PCP   -Quit smoking about 2 months ago  -Non compliant with inhalers at home  -Suspect underlying lung disease playing a role in mild hypoxia with exertion. S/p prednisone 40mg PO started yesterday evening (received 2 doses) - hypoxia with exertion now resolved.   -Pred 30mg PO qd x 3 days then stop  -D/c Stiolto, start Symbicort 160mcg/4.5mcg 2 puffs BID and Spiriva 18 mcg 1 capsule inhaled qd  -Albuterol 2 puffs q6h PRN  -F/u with outpatient pulm Dr. Augustine Tapia on discharge.      COVID-19.   - by hx - about 3-4 weeks ago  -No significant GGO on CTA  -No indication for Remdesivir or Decadron at this time   -ID f/u.     tob use d/o  - nicotine patch  - tob cessation counselling    anemia  -  iron studies c/w acd    parkinson's disease  - cont home meds    h/o drug abuse  - psych eval    cleared by pulm for d/c  
66 y/o M with PMH Parkinson's, COPD, DM, covid in past unsure of exact dates reportedly 3-4 weeks ago while in rehab), recent falls, also recently intubated and extubated at [facility] (3/17 - 4/4 or 4/5) reportedly for CHF exacerbation. D/c'd to rehab. Now presents to ED with SOB, diaphoresis, night sweats, increase in leg size. CTA chest read with R subsegmental PE - reviewed scan with chest radiology, RLL segmental clot present as well.

## 2021-05-12 LAB
AMPHET UR-MCNC: NEGATIVE — SIGNIFICANT CHANGE UP
BARBITURATES, URINE.: NEGATIVE — SIGNIFICANT CHANGE UP
BENZODIAZ UR-MCNC: NEGATIVE — SIGNIFICANT CHANGE UP
COCAINE METAB.OTHER UR-MCNC: SIGNIFICANT CHANGE UP
CREATININE, URINE THERAPEUTIC: 178.4 MG/DL — SIGNIFICANT CHANGE UP
METHADONE UR-MCNC: NEGATIVE — SIGNIFICANT CHANGE UP
METHAQUALONE UR QL: NEGATIVE — SIGNIFICANT CHANGE UP
METHAQUALONE UR-MCNC: NEGATIVE — SIGNIFICANT CHANGE UP
OPIATES UR-MCNC: NEGATIVE — SIGNIFICANT CHANGE UP
PCP UR-MCNC: NEGATIVE — SIGNIFICANT CHANGE UP
PROPOXYPH UR QL: NEGATIVE — SIGNIFICANT CHANGE UP
THC UR QL: NEGATIVE — SIGNIFICANT CHANGE UP

## 2021-09-01 ENCOUNTER — EMERGENCY (EMERGENCY)
Facility: HOSPITAL | Age: 66
LOS: 1 days | Discharge: HOME | End: 2021-09-01
Attending: EMERGENCY MEDICINE
Payer: MEDICARE

## 2021-09-01 VITALS
SYSTOLIC BLOOD PRESSURE: 145 MMHG | DIASTOLIC BLOOD PRESSURE: 84 MMHG | OXYGEN SATURATION: 95 % | HEIGHT: 72 IN | WEIGHT: 250 LBS | TEMPERATURE: 98 F | RESPIRATION RATE: 18 BRPM | HEART RATE: 87 BPM

## 2021-09-01 VITALS
RESPIRATION RATE: 16 BRPM | TEMPERATURE: 98 F | OXYGEN SATURATION: 100 % | SYSTOLIC BLOOD PRESSURE: 148 MMHG | DIASTOLIC BLOOD PRESSURE: 88 MMHG | HEART RATE: 69 BPM

## 2021-09-01 DIAGNOSIS — Z98.89 OTHER SPECIFIED POSTPROCEDURAL STATES: Chronic | ICD-10-CM

## 2021-09-01 PROCEDURE — 82962 GLUCOSE BLOOD TEST: CPT

## 2021-09-01 PROCEDURE — 99284 EMERGENCY DEPT VISIT MOD MDM: CPT | Mod: 25

## 2021-09-01 PROCEDURE — 99284 EMERGENCY DEPT VISIT MOD MDM: CPT

## 2021-09-01 RX ORDER — APIXABAN 2.5 MG/1
5 TABLET, FILM COATED ORAL ONCE
Refills: 0 | Status: COMPLETED | OUTPATIENT
Start: 2021-09-01 | End: 2021-09-01

## 2021-09-01 RX ORDER — SODIUM CHLORIDE 9 MG/ML
1000 INJECTION INTRAMUSCULAR; INTRAVENOUS; SUBCUTANEOUS ONCE
Refills: 0 | Status: COMPLETED | OUTPATIENT
Start: 2021-09-01 | End: 2021-09-01

## 2021-09-01 RX ADMIN — SODIUM CHLORIDE 1000 MILLILITER(S): 9 INJECTION INTRAMUSCULAR; INTRAVENOUS; SUBCUTANEOUS at 22:50

## 2021-09-01 RX ADMIN — APIXABAN 5 MILLIGRAM(S): 2.5 TABLET, FILM COATED ORAL at 23:50

## 2021-09-01 NOTE — ED ADULT TRIAGE NOTE - CHIEF COMPLAINT QUOTE
supposed to start taking insulin but has not received it in the mail yet, hyperglycemic to 500's today, asymptomatic

## 2021-09-01 NOTE — ED PROVIDER NOTE - CLINICAL SUMMARY MEDICAL DECISION MAKING FREE TEXT BOX
Jo: Patient with hyperglycemia. prescribed insulin for first time but not able to obtain needles. FGS improving on its own. on oral antihyperglycemics as well. no abdominal pain, no n/v. no headache, no confusion. attempted to get needles for patient but materials does not carry.  patient with wife and understands plan and comfortable with going home.  will d/c home. recommend f/u with pmd.

## 2021-09-01 NOTE — ED PROVIDER NOTE - NSFOLLOWUPINSTRUCTIONS_ED_ALL_ED_FT
There are no signs of emergency conditions requiring admission to the hospital on today's workup.  Based on the evaluation, a presumptive diagnosis was made, however, further evaluation may be required by your primary care physician or a specialist for a more definitive diagnosis.  Therefore, please follow-up as directed or return to the Emergency Department if your symptoms change or worsen.    We recommend that you:  1. See your primary care physician within the next 72 hours for follow up.  Bring a copy of your discharge paperwork (including any test results) to your doctor.  2. Please take your medications including insulin as prescribed.       *** Return immediately if you have worsening symptoms, chest pain, Shortness of Breath, abdominal pain, Nausea/Vomiting/Diarrhea, dizziness, weakness, confusion, vision changes, urinary symptoms, syncope, falls, trauma, discharge, bleeding, fevers, or any other new/concerning symptoms. *** There are no signs of emergency conditions requiring admission to the hospital on today's workup.  Based on the evaluation, a presumptive diagnosis was made, however, further evaluation may be required by your primary care physician or a specialist for a more definitive diagnosis.  Therefore, please follow-up as directed or return to the Emergency Department if your symptoms change or worsen.    We recommend that you:  1. See your primary care physician within the next 24 hours for follow up.  Bring a copy of your discharge paperwork (including any test results) to your doctor.  2. Please take your medications including insulin as prescribed.       *** Return immediately if you have worsening symptoms, chest pain, Shortness of Breath, abdominal pain, Nausea/Vomiting/Diarrhea, dizziness, weakness, confusion, vision changes, urinary symptoms, syncope, falls, trauma, discharge, bleeding, fevers, or any other new/concerning symptoms. ***

## 2021-09-01 NOTE — ED ADULT NURSE NOTE - OBJECTIVE STATEMENT
65y Male AOx4 with PMH of Type II DM presents to the ED c/o hyperglycemia. Pt states he was recently taken off Metformin and placed on insulin. 65y Male AOx4 with PMH of Type II DM presents to the ED c/o hyperglycemia. Pt states he was recently taken off Metformin and placed on insulin. Reports when he was about to administer his insulin shot, he noticed there was no needle and unable to obtain equipment today. Called pharmacy who then referred pt to ED. BGL in waiting room 266. Repeat . Pt denies feeling any dizziness or lightheadedness at this time. Denies having any symptoms. VSS. Side rails up, bed in lowest position, oriented to call bell, safety maintained.

## 2021-09-01 NOTE — ED PROVIDER NOTE - OBJECTIVE STATEMENT
65 year old male with pmhx of DM, parkinson's dz, COPD presents to the ED after having a episode of BGL of >500 and came to the ED as he does not have insulin. patient was recently prescribed insulin but has not received it in the mail to start it. patient reports coming to the ED for evaluation. patient denies any complaints. patient denies polyuria, polydipsia, chest pain, Shortness of Breath, abdominal pain, Nausea/Vomiting/Diarrhea, dizziness, weakness, confusion, vision changes, urinary symptoms, syncope, falls, trauma, discharge, bleeding, fevers 65 year old male with pmhx of DM, parkinson's dz, COPD presents to the ED after having a episode of BGL of >500. Patient saw endocrinologist yesterday and started on glargline insulin and was supposed to receive needles by pharmacy but did not and was not able to take insuline. fgs at 4 pm was 506 and pharmacist recommended patient go to ER. Patient long standing diabetic on metformin previously. and now on glipizide and insulin. has not been prescribed insulin prior to yesterday. patient denies any complaints. patient denies polyuria, polydipsia, chest pain, Shortness of Breath, abdominal pain, Nausea/Vomiting/Diarrhea, dizziness, weakness, confusion, vision changes, urinary symptoms, syncope, falls, trauma, discharge, bleeding, fevers. no headahce, no confusion.

## 2021-09-01 NOTE — ED PROVIDER NOTE - PATIENT PORTAL LINK FT
You can access the FollowMyHealth Patient Portal offered by [facility] by registering at the following website: [email or web address]. By joining [employer]’s FollowMyHealth portal, you will also be able to view your health information using other applications (apps) compatible with our system.

## 2021-09-01 NOTE — ED PROVIDER NOTE - PHYSICAL EXAMINATION
On Physical Exam:  General: well appearing, in NAD, speaking clearly in full sentences and without difficulty; cooperative with exam  HEENT: PERRL, MMM  Neck: no neck tenderness, no nuchal rigidity  Cardiac: normal s1, s2; RRR; no MGR  Lungs: CTABL  Abdomen: soft nontender/nondistended  : no bladder tenderness or distension  Skin: warm, intact, no rash  Extremities: no peripheral edema, no gross deformities  Neuro: no gross neurologic deficits On Physical Exam:  General: well appearing, in NAD, speaking clearly in full sentences and without difficulty; cooperative with exam  HEENT: PERRL, MMM  Neck: no neck tenderness, no nuchal rigidity  Cardiac: normal s1, s2; RRR; no MGR  Lungs: CTABL  Abdomen: soft nontender/nondistended  : no bladder tenderness or distension  Skin: warm, intact, no rash  Extremities: no peripheral edema, no gross deformities  Neuro: no gross neurologic deficits, tremor on exam., alert and oriented x 3. no focal deficits.

## 2021-09-01 NOTE — ED PROVIDER NOTE - PRINCIPAL DIAGNOSIS
1. Wear compression stockings for bilateral leg swelling  2. Recommend to check blood sugar four times a day, before meals and at bedtime. Bring log book to next visit  3. Continue Lantus 42 units in the morning, 40 U in the evening  4. Continue NovoLog 37-43 units before breakfast and dinner plus SS   5. Follow up in 3 months with blood tests per orders       Hyperglycemia

## 2021-09-01 NOTE — ED PROVIDER NOTE - NS ED ROS FT
Review of Systems:  · Constitutional: no chills, no fever, no night sweats, no weight loss  · Nose: no epistaxis  · Mouth/Throat: no difficulty in swallowing, trachea midline, uvula midline  . Cardio: No CP, no palpitations, no chest pressure, no ripping chest pain  · Respiratory: no cough, no exertional dyspnea, no hemoptysis, no orthopnea, no shortness of breath  · Gastrointestinal: no abdominal pain, no diarrhea, no melena, no nausea, no vomiting  · Genitourinary: no difficulty urinating, no dysuria, no hematuria  · MUSCULOSKELETAL: FROM of all extremities  · Skin: no abrasion; no bruising; no laceration  · Neurological: no change in level of consciousness, no headache, no seizures  · ROS STATEMENT: all other ROS negative except as per HPI

## 2021-09-01 NOTE — ED PROVIDER NOTE - PROGRESS NOTE DETAILS
Jo: Patient fgs is 141. no n/v. no adominal pain, no nheadache. no symptoms. patient and wife prefer not to  check lab work. attempted to obtain needles for patient for insulin pen but not able to acquire. recommend c/w home meds and obtain needles tomorrow from doctor/pharmacy.  will d/c home.

## 2022-01-24 NOTE — PATIENT PROFILE ADULT - LONGEST PERIOD TOBACCO-FREE
Jamal Martin MD;  Location: St. Luke's Solomon Chance MD;  [facility]  Yusuf Cruz MD;  [facility]  Jamal Martin MD;   Location: [facility] Nehemias Quinteros MD;  Location: [facility] 1 month

## 2022-04-20 NOTE — PROGRESS NOTE ADULT - SUBJECTIVE AND OBJECTIVE BOX
DATE OF SERVICE: 05-03-21 @ 16:13    Patient is a 65y old  Male who presents with a chief complaint of SOB (03 May 2021 13:24)      SUBJECTIVE / OVERNIGHT EVENTS:  No chest pain. No shortness of breath. No complaints. No events overnight.     MEDICATIONS  (STANDING):  dextrose 40% Gel 15 Gram(s) Oral once  dextrose 5%. 1000 milliLiter(s) (50 mL/Hr) IV Continuous <Continuous>  dextrose 5%. 1000 milliLiter(s) (100 mL/Hr) IV Continuous <Continuous>  dextrose 50% Injectable 25 Gram(s) IV Push once  dextrose 50% Injectable 12.5 Gram(s) IV Push once  dextrose 50% Injectable 25 Gram(s) IV Push once  diVALproex  milliGRAM(s) Oral three times a day  enoxaparin Injectable 100 milliGRAM(s) SubCutaneous two times a day  gabapentin 400 milliGRAM(s) Oral two times a day  glucagon  Injectable 1 milliGRAM(s) IntraMuscular once  insulin lispro (ADMELOG) corrective regimen sliding scale   SubCutaneous three times a day before meals  insulin lispro (ADMELOG) corrective regimen sliding scale   SubCutaneous at bedtime  levothyroxine 100 MICROGram(s) Oral daily  melatonin 10 milliGRAM(s) Oral at bedtime  mirtazapine 7.5 milliGRAM(s) Oral at bedtime  OLANZapine 5 milliGRAM(s) Oral two times a day  sertraline 100 milliGRAM(s) Oral daily  simvastatin 40 milliGRAM(s) Oral at bedtime  tiotropium 2.5 MICROgram(s)/olodaterol 2.5 MICROgram(s) (STIOLTO) Inhaler 2 Puff(s) Inhalation daily    MEDICATIONS  (PRN):  ALBUTerol    90 MICROgram(s) HFA Inhaler 2 Puff(s) Inhalation every 6 hours PRN Shortness of Breath and/or Wheezing      Vital Signs Last 24 Hrs  T(C): 36.9 (03 May 2021 11:46), Max: 36.9 (03 May 2021 11:46)  T(F): 98.5 (03 May 2021 11:46), Max: 98.5 (03 May 2021 11:46)  HR: 64 (03 May 2021 11:46) (60 - 69)  BP: 111/66 (03 May 2021 11:46) (111/66 - 155/75)  BP(mean): --  RR: 18 (03 May 2021 11:46) (16 - 18)  SpO2: 98% (03 May 2021 11:46) (98% - 100%)  CAPILLARY BLOOD GLUCOSE      POCT Blood Glucose.: 202 mg/dL (03 May 2021 12:10)  POCT Blood Glucose.: 174 mg/dL (03 May 2021 08:08)  POCT Blood Glucose.: 211 mg/dL (02 May 2021 21:05)  POCT Blood Glucose.: 239 mg/dL (02 May 2021 17:36)    I&O's Summary    02 May 2021 07:01  -  03 May 2021 07:00  --------------------------------------------------------  IN: 0 mL / OUT: 650 mL / NET: -650 mL    03 May 2021 07:01  -  03 May 2021 16:13  --------------------------------------------------------  IN: 0 mL / OUT: 700 mL / NET: -700 mL        PHYSICAL EXAM:  GENERAL: NAD, well-developed  HEAD:  Atraumatic, Normocephalic  EYES: EOMI, PERRLA, conjunctiva and sclera clear  NECK: Supple, No JVD  CHEST/LUNG: Clear to auscultation bilaterally; No wheeze  HEART: Regular rate and rhythm; No murmurs, rubs, or gallops  ABDOMEN: Soft, Nontender, Nondistended; Bowel sounds present  EXTREMITIES:  2+ Peripheral Pulses, No clubbing, cyanosis, or edema  PSYCH: AAOx3  NEUROLOGY: non-focal  SKIN: No rashes or lesions    LABS:                        10.0   8.27  )-----------( 422      ( 03 May 2021 06:09 )             31.7     05-03    140  |  98  |  13  ----------------------------<  176<H>  4.2   |  27  |  0.56    Ca    9.3      03 May 2021 06:09  Phos  4.2     05-03  Mg     1.7     05-03    TPro  7.1  /  Alb  3.7  /  TBili  0.3  /  DBili  x   /  AST  11  /  ALT  11  /  AlkPhos  68  05-02    PT/INR - ( 02 May 2021 09:00 )   PT: 12.1 sec;   INR: 1.01 ratio         PTT - ( 02 May 2021 09:00 )  PTT:22.0 sec      < from: [facility] Duplex Lower Ext Vein Scan, Bilat (05.02.21 @ 15:51) >    INTERPRETATION:  CLINICAL INFORMATION: Covid positive. Shortness of breath. Rule out DVT.    COMPARISON: None available.    TECHNIQUE: Duplex sonography of the BILATERAL LOWER extremity veins with color and spectral Doppler, with and without compression.    FINDINGS:    RIGHT:  Normal compressibility of the RIGHT common femoral, femoral and popliteal veins.  Doppler examination shows normal spontaneous and phasic flow.  No RIGHT calf vein thrombosis is detected.    LEFT:  Normal compressibility of the LEFT common femoral, femoral and popliteal veins.  Doppler examination shows normal spontaneous and phasic flow.      Noncompressible occlusive clot of the left soleal vein, (calf vein).          IMPRESSION:  1. Positive for thrombosis in the left soleal vein (calf vein), consistent with superficial venous thrombosis.  2. Otherwise, there is no evidence for deep venous thrombosis in either lower extremity.    The ultrasound technologist notified NATALYA Burton at time of exam on 5/2/2021.          < end of copied text >    < from: TTE with Doppler (w/Cont) (05.03.21 @ 08:16) >  PROCEDURE: Transthoracic echocardiogram with 2-D, M-Mode  and complete spectral and color flow Doppler. Verbal  consent was obtained for injection of  Ultrasonic Enhancing  Agent following a discussion of risks and benefits.  Following intravenous injection of Ultrasonic Enhancing  Agent , harmonic imaging was performed.  INDICATION: Other pulmonary embolism without acute cor  pulmonale (I26.99)  ------------------------------------------------------------------------  Dimensions:    Normal Values:  LA:     4.3    2.0 - 4.0 cm  Ao:     3.8    2.0 - 3.8 cm  SEPTUM: 1.1    0.6 - 1.2 cm  PWT:    1.1    0.6 - 1.1 cm  LVIDd:  5.9    3.0 - 5.6 cm  LVIDs:  4.4    1.8 - 4.0 cm  Derived variables:  LVMI: 119 g/m2  RWT: 0.37  EF (Visual Estimate): 55 %  Doppler Peak Velocity (m/sec): AoV=1.9 TV=2.2  ------------------------------------------------------------------------  Observations:  Mitral Valve: Mitral annular calcification.  Aortic Valve/Aorta: Calcified aortic valve with normal  opening. Mild aortic regurgitation.  Normal aortic root size.  Left Atrium: Normal left atrium.  Left Ventricle:Endocardial visualization enhanced with  intravenous injection of Ultrasonic Enhancing Agent  (Definity).  Mild left ventricular enlargement.  Normal left ventricular systolic function. No segmental  wall motion abnormalities.  ---LVOTd 2.5 cm, LVOT TVI 22.4 cm. EDV about  160 cc.  Normal diastolic function.  Right Heart: Normal right atrium. Normal right ventricular  size and function.  Normal tricuspid valve. Minimal tricuspid regurgitation.  Normal pulmonic valve.  Pericardium/Pleura: Normal pericardium with no pericardial  effusion.  Hemodynamic: Estimated right atrial pressure is normal.  No evidence of pulmoanry hypertension.  No PFO seen with color Doppler.  ------------------------------------------------------------------------  Conclusions:  Endocardial visualization enhanced with intravenous  injection of Ultrasonic Enhancing Agent (Definity).  Mild left ventricular enlargement.  Normal left ventricular systolic function. No segmental  wall motion abnormalities.    < end of copied text >  < from: CT Angio Chest w/ IV Cont (05.02.21 @ 07:44) >    *** ADDENDUM 05/02/2021  ***    Impression The positive finding of pulmonary embolism was discussed with the ER doctor, Dr. Salomon by the resident Dr. Surjit Haas.    *** END OF ADDENDUM 05/02/2021  ***      PROCEDURE DATE:  05/02/2021            INTERPRETATION:  CLINICAL INFORMATION: Shortness of breath, concern for pulmonary embolism.    COMPARISON: CT chest from 7/16/2019    CONTRAST/COMPLICATIONS:  IV Contrast: 88 cc of Omnipaque 350 were injected and 12 cc were discarded.  Oral Contrast: None  Complications: None reported    PROCEDURE:  CT Angiography of the Chest.  Sagittal and coronal reformats were performed as well as 3D (MIP) reconstructions.    FINDINGS:    LUNGS AND AIRWAYS: Patent central airways. There is mild linear atelectasis in the lower lobes and lingula.  PLEURA: No pleural effusion.  MEDIASTINUM AND ROB: Mediastinal and hilar lymphadenopathy which is a change from the prior study. Right paratracheal lymph node measures up to 1.0 cm in short axis. Subcarinal lymph node measures 1.7 cm..  VESSELS: Subsegmental pulmonary embolism. Right lower lobe best seen on image 285 of series 3. Coronary artery calcifications.  HEART: Heart size is normal. No pericardial effusion. Trace fluid within the superior aortic recess.  CHEST WALL AND LOWER NECK: Within normal limits.  VISUALIZED UPPER ABDOMEN: Within normal limits.  BONES: Healed fracture deformities of the right lateral eighth and ninth ribs. There is also a healed right fifth lateral rib fracture The previously noted sclerotic focus within the anterolateral right eighth rib is not visualized.    IMPRESSION:  Subsegmental pulmonary embolism to the right lower lobe.  Mediastinal and hilar adenopathy new from the prior study which is nonspecific.  Healed fracture deformities of the right lateral eighth and ninth ribs. Healed rib fracture of the fifth lateral right rib. The previously noted sclerotic focus within the anterolateral right eighth rib is not visualized and likely represented healing rib fracture.          ***Please see the addendum at the top of this report. It may contain additional important information or changes.****      < end of copied text >    RADIOLOGY & ADDITIONAL TESTS:    Imaging Personally Reviewed:    Consultant(s) Notes Reviewed:      Care Discussed with Consultants/Other Providers:   normal...

## 2022-12-07 NOTE — H&P ADULT - NSHPSOURCEINFORD_GEN_ALL_CORE
Patient is a 55 year old male with PMH of breast CA in remission, lymphoma, HTN, treatment-resistant schizoaffective disorder depressive type, sent in after a fall and recently with worsening disorganized behaviour. Was planned for transfer back to  but became agitated and transferred to Stepdown.     Disorganized behavior; history of Schizoaffective disorder with Agitation   -on constant observation   -continue haldol BID  -continue ativan 1 mg TID  -continue cogentin  -Valium IV prn   Psychiatry recs   -eventual return to [facility]     Confusion likely due to worsening underlying psych disease  CTH and MRI negative.  B12 low/normal - continue injections as ordered  TSH and ammonia level negative, UA negative    Frequent Falls   Unclear if this is due to psychological illness   patient able to ambulate without assistance when he desires  CT head/ neck no acute findings. MRI brain also negative.  Acute minimally displaced right lateral seventh rib fracture. Healing distal left rib fractures.   fall precautions  Uncooperative with PT evaluation but was a code flight overnight and ambulating without any acute issues    History of breast cancer.- MRI brain  negative for mets  discussed with Dr Pinto's PA - Daniel and Dr Yusuf   no further plans for chemotherapy - can follow up with Dr pinto eventually     HTN (hypertension).   BP stable; continue lisinopril    HFrEF -  chronic   -TTE with EF of 40-45% for which cardio was consulted  -no evidence of decompensated heart failure, TNI negative and non ischemic EKG changes  -seen by cardiology on 11/26 and recommended outpatient follow up with primary cardiology    Mild Hyponatremia possibly due to polydipsia vs SIADH - resolved  -appears euvolemic  -urine studies reviewed  -continue salt tabs and free water restriction    DAVID likely due to prerenal azotemia - resolved    DVT ppx - Heparin SC    Dispo -medically stable - needs psych inpatient   no active skin wounds, no evidence of MRSA infevtion - may be colonized. - no need for treatment with Mupirocin. NM to clarify with infection control before we dc mupirocin.   Plan discussed with  RN, SW     Chart(s)/Patient

## 2024-02-13 NOTE — PATIENT PROFILE ADULT - HEALTH LITERACY
[FreeTextEntry1] : Extensive behavioral health counseling provided to patient follow-up in 2 to 4 weeks. no

## 2024-07-17 NOTE — PHYSICAL THERAPY INITIAL EVALUATION ADULT - BALANCE DISTURBANCE, IDENTIFIED IMPAIRMENT CONTRIBUTE, REHAB EVAL
----- Message from Daisy Page sent at 7/17/2024  9:40 AM CDT -----  Contact: [phone]  Patient states please request her records from [facility]. There fax number is Medical records [phone]. Please call and advise.    Thank you and have a great day.   decreased strength

## 2024-12-23 NOTE — BH CONSULTATION LIAISON PROGRESS NOTE - NSBHMSESPEECH_PSY_A_CORE
Diagnosis: Acute congestive heart failure, unspecified heart failure type [[id number]]   Future Attending Provider: NADIA BEEBE [[id number]]   Is the patient being sent to ED Observation?: No   Normal volume, rate, productivity, spontaneity and articulation